# Patient Record
Sex: FEMALE | Race: OTHER | HISPANIC OR LATINO | Employment: UNEMPLOYED | URBAN - METROPOLITAN AREA
[De-identification: names, ages, dates, MRNs, and addresses within clinical notes are randomized per-mention and may not be internally consistent; named-entity substitution may affect disease eponyms.]

---
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
## 2023-01-05 ENCOUNTER — HOSPITAL ENCOUNTER (OUTPATIENT)
Facility: MEDICAL CENTER | Age: 18
End: 2023-01-05
Attending: NURSE PRACTITIONER
Payer: COMMERCIAL

## 2023-01-05 ENCOUNTER — ROUTINE PRENATAL (OUTPATIENT)
Dept: OBGYN | Facility: CLINIC | Age: 18
End: 2023-01-05

## 2023-01-05 VITALS — SYSTOLIC BLOOD PRESSURE: 116 MMHG | DIASTOLIC BLOOD PRESSURE: 74 MMHG | WEIGHT: 179.6 LBS

## 2023-01-05 DIAGNOSIS — Z34.03 ENCOUNTER FOR SUPERVISION OF NORMAL FIRST PREGNANCY IN THIRD TRIMESTER: ICD-10-CM

## 2023-01-05 PROCEDURE — 0502F SUBSEQUENT PRENATAL CARE: CPT | Performed by: NURSE PRACTITIONER

## 2023-01-05 NOTE — PROGRESS NOTES
SUBJECTIVE:  Pt is a 17 y.o.   at 36w2d  gestation. Presents today for follow-up prenatal care. Reports no issues at this time.  Reports good  fetal movement. Denies regular cramping/contractions, bleeding or leaking of fluid. Denies dysuria, headaches, N/V. Generally feels well today. Reports some lower abdominal pain.     OBJECTIVE:  - See prenatal vitals flow  -   Vitals:    23 1339   BP: 116/74   Weight: 179 lb 9.6 oz                 ASSESSMENT:   - IUP at 36w2d    - S=D   -   Patient Active Problem List    Diagnosis Date Noted    Encounter for supervision of normal first pregnancy in third trimester 10/14/2022         PLAN:  - S/sx pregnancy and labor warning signs vs general discomforts discussed  - Fetal movements and/or kick counts reviewed   - Adequate hydration reinforced  - Nutrition/exercise/vitamin education; continue PNV  - S/p Tdap and flu vacc  - GBS collected   - Anticipatory guidance given  - RTC in 1 weeks for follow-up prenatal care

## 2023-01-06 PROBLEM — B95.1 GROUP BETA STREP POSITIVE: Status: ACTIVE | Noted: 2023-01-06

## 2023-01-06 LAB — GP B STREP DNA SPEC QL NAA+PROBE: POSITIVE

## 2023-01-12 ENCOUNTER — ROUTINE PRENATAL (OUTPATIENT)
Dept: OBGYN | Facility: CLINIC | Age: 18
End: 2023-01-12

## 2023-01-12 VITALS — DIASTOLIC BLOOD PRESSURE: 64 MMHG | WEIGHT: 181.4 LBS | SYSTOLIC BLOOD PRESSURE: 120 MMHG

## 2023-01-12 DIAGNOSIS — Z34.03 ENCOUNTER FOR SUPERVISION OF NORMAL FIRST PREGNANCY IN THIRD TRIMESTER: ICD-10-CM

## 2023-01-12 PROCEDURE — 0502F SUBSEQUENT PRENATAL CARE: CPT | Performed by: PHYSICIAN ASSISTANT

## 2023-01-12 NOTE — PROGRESS NOTES
Pt has no complaints with cramping, UCs, VB, LOF though pt has had low back pain. +FM. GBS pos - pt notified of results. Stretching exercises d/w pt in detail, also pt to try warm packs, massage and Tylenol for back pain prn. Daily FKC and walks recommended. D/w pt IOL policy for 40-41wk, pt receptive. RTC 1 wk or sooner prn.

## 2023-01-19 ENCOUNTER — ROUTINE PRENATAL (OUTPATIENT)
Dept: OBGYN | Facility: CLINIC | Age: 18
End: 2023-01-19

## 2023-01-19 VITALS — WEIGHT: 180.2 LBS | SYSTOLIC BLOOD PRESSURE: 112 MMHG | DIASTOLIC BLOOD PRESSURE: 68 MMHG

## 2023-01-19 DIAGNOSIS — B95.1 GROUP BETA STREP POSITIVE: ICD-10-CM

## 2023-01-19 DIAGNOSIS — Z34.03 ENCOUNTER FOR SUPERVISION OF NORMAL FIRST PREGNANCY IN THIRD TRIMESTER: ICD-10-CM

## 2023-01-19 PROCEDURE — 0502F SUBSEQUENT PRENATAL CARE: CPT

## 2023-01-19 NOTE — PROGRESS NOTES
Pt here today for OB follow up  +GBS, pt informed   Reports +FM  WT: 180.2 lb  BP: 112/68  Preferred pharmacy verified with pt.  MFM Appointment: not at this time   Pt states no complaints or concerns today  Good # [phone]    Pt would like to have her cervix checked today

## 2023-01-19 NOTE — PROGRESS NOTES
S:  Anacelia here for routine OB follow up.  Reports good FM.  Denies VB, LOF, RUCs, or vaginal DC. Patient requests VE: Yes. Informed consent for vaginal exam. Patient agrees to vaginal exam: Yes    O:    Vitals:    01/19/23 1316   BP: 112/68   Weight: 180 lb 3.2 oz     See flow sheet.  VE: difficult due to patient discomfort. Closed/70%?/-2, posterior, moderate  [place]: 4    A:    IUP at 38w2d  S=D  Patient Active Problem List    Diagnosis Date Noted    Group beta Strep positive 01/06/2023    Encounter for supervision of normal first pregnancy in third trimester 10/14/2022       P:  1.  Continue FKCs.         2.  Labor precautions given.  Instructions given on where to go.  Pt receptive to education.         3.  D/w pt induction of labor indications, risks/benefits, and recommendations.  After informed consent, patient elects IOL at 41wks PRN. Request for IOL sent today       4.  Questions answered.         5.  Encouraged adequate water intake       6.  GBS positive - pt. aware       7.  F/u 1wk and PRN    Orders Placed This Encounter    INDUCTION OF LABOR       Evangelina Davis C.N.M.

## 2023-01-26 ENCOUNTER — ROUTINE PRENATAL (OUTPATIENT)
Dept: OBGYN | Facility: CLINIC | Age: 18
End: 2023-01-26

## 2023-01-26 ENCOUNTER — HOSPITAL ENCOUNTER (EMERGENCY)
Facility: MEDICAL CENTER | Age: 18
End: 2023-01-26
Attending: OBSTETRICS & GYNECOLOGY | Admitting: OBSTETRICS & GYNECOLOGY
Payer: MEDICAID

## 2023-01-26 VITALS — DIASTOLIC BLOOD PRESSURE: 70 MMHG | SYSTOLIC BLOOD PRESSURE: 126 MMHG | WEIGHT: 186.8 LBS

## 2023-01-26 VITALS
BODY MASS INDEX: 37.7 KG/M2 | HEIGHT: 59 IN | SYSTOLIC BLOOD PRESSURE: 127 MMHG | DIASTOLIC BLOOD PRESSURE: 84 MMHG | WEIGHT: 187 LBS | HEART RATE: 89 BPM | TEMPERATURE: 97 F

## 2023-01-26 DIAGNOSIS — O47.9 UTERINE CONTRACTIONS: ICD-10-CM

## 2023-01-26 DIAGNOSIS — Z34.03 ENCOUNTER FOR SUPERVISION OF NORMAL FIRST PREGNANCY IN THIRD TRIMESTER: ICD-10-CM

## 2023-01-26 PROCEDURE — 99283 EMERGENCY DEPT VISIT LOW MDM: CPT | Mod: 25

## 2023-01-26 PROCEDURE — 59025 FETAL NON-STRESS TEST: CPT | Mod: 26

## 2023-01-26 PROCEDURE — 59025 FETAL NON-STRESS TEST: CPT

## 2023-01-26 PROCEDURE — 0502F SUBSEQUENT PRENATAL CARE: CPT

## 2023-01-26 PROCEDURE — 99282 EMERGENCY DEPT VISIT SF MDM: CPT | Mod: 25

## 2023-01-26 NOTE — PROGRESS NOTES
Pt here today for OB follow up  Positive GBS, pt informed   Pt report No FM since 3 am today   WT: 186.8 lb  BP: 126/70  Preferred pharmacy verified with pt.  MFM Appointment: not at this time   Pt states has bee having contractions 5 minutes apart, states her contractions started this morning at 6:00 am. I addition pt reports had a bloody mucus. Denies LOF.   Good # [phone]

## 2023-01-26 NOTE — PROGRESS NOTES
S:  Anacelia here for routine OB follow up.  Contractions for the last 3 hours, now feeling them about 5 min apart. Reports less FM since contractions started.  Denies VB, LOF. Has some mucousy vaginal DC. Patient requests VE: Yes. Informed consent for vaginal exam. Patient agrees to vaginal exam: Yes    O:    Vitals:    01/26/23 0918   BP: 126/70   Weight: 186 lb 12.8 oz   See flow sheet.  VE: 1/80/-2, posterior, soft, vertex    A:    IUP at 39w2d  S=D  Patient Active Problem List    Diagnosis Date Noted    Group beta Strep positive 01/06/2023    Encounter for supervision of normal first pregnancy in third trimester 10/14/2022     P:  1.  Continue FKCs.         2.  Labor precautions given.  Instructions given on where to go.  Pt receptive to education.         3.   Reviewed early labor comfort measures, when to RTC       4.  Questions answered.         5.  Encouraged adequate water intake       6.  GBS + - reviewed w pt. And reviewed need for abx in labor       7.  F/u 1wk and PRN    MARYBETH RomanNMARK.

## 2023-01-27 ENCOUNTER — APPOINTMENT (OUTPATIENT)
Dept: RADIOLOGY | Facility: MEDICAL CENTER | Age: 18
End: 2023-01-27
Attending: INTERNAL MEDICINE
Payer: MEDICAID

## 2023-01-27 ENCOUNTER — ANESTHESIA (OUTPATIENT)
Dept: ANESTHESIOLOGY | Facility: MEDICAL CENTER | Age: 18
End: 2023-01-27
Payer: MEDICAID

## 2023-01-27 ENCOUNTER — APPOINTMENT (OUTPATIENT)
Dept: RADIOLOGY | Facility: MEDICAL CENTER | Age: 18
End: 2023-01-27
Attending: OBSTETRICS & GYNECOLOGY
Payer: MEDICAID

## 2023-01-27 ENCOUNTER — HOSPITAL ENCOUNTER (INPATIENT)
Facility: MEDICAL CENTER | Age: 18
LOS: 3 days | End: 2023-01-30
Attending: OBSTETRICS & GYNECOLOGY | Admitting: OBSTETRICS & GYNECOLOGY
Payer: MEDICAID

## 2023-01-27 ENCOUNTER — ANESTHESIA EVENT (OUTPATIENT)
Dept: ANESTHESIOLOGY | Facility: MEDICAL CENTER | Age: 18
End: 2023-01-27
Payer: MEDICAID

## 2023-01-27 DIAGNOSIS — R52 POSTPARTUM PAIN: ICD-10-CM

## 2023-01-27 DIAGNOSIS — O15.9 ECLAMPSIA: ICD-10-CM

## 2023-01-27 DIAGNOSIS — R56.9 SEIZURE (HCC): ICD-10-CM

## 2023-01-27 PROBLEM — I10 HYPERTENSION: Status: ACTIVE | Noted: 2023-01-27

## 2023-01-27 PROBLEM — G93.6 CEREBRAL EDEMA (HCC): Status: ACTIVE | Noted: 2023-01-27

## 2023-01-27 LAB
ALBUMIN SERPL BCP-MCNC: 2.9 G/DL (ref 3.2–4.9)
ALBUMIN/GLOB SERPL: 0.9 G/DL
ALP SERPL-CCNC: 235 U/L (ref 30–99)
ALT SERPL-CCNC: 11 U/L (ref 2–50)
AMPHET UR QL SCN: NEGATIVE
ANION GAP SERPL CALC-SCNC: 19 MMOL/L (ref 7–16)
APPEARANCE UR: CLEAR
AST SERPL-CCNC: 21 U/L (ref 12–45)
BACTERIA #/AREA URNS HPF: NEGATIVE /HPF
BARBITURATES UR QL SCN: NEGATIVE
BASE EXCESS BLDA CALC-SCNC: -11 MMOL/L (ref -4–3)
BASE EXCESS BLDA CALC-SCNC: -7 MMOL/L (ref -4–3)
BASOPHILS # BLD AUTO: 0.2 % (ref 0–1.8)
BASOPHILS # BLD AUTO: 0.3 % (ref 0–1.8)
BASOPHILS # BLD: 0.04 K/UL (ref 0–0.12)
BASOPHILS # BLD: 0.05 K/UL (ref 0–0.12)
BENZODIAZ UR QL SCN: NEGATIVE
BILIRUB SERPL-MCNC: 0.2 MG/DL (ref 0.1–1.5)
BILIRUB UR QL STRIP.AUTO: NEGATIVE
BODY TEMPERATURE: 39.7 CENTIGRADE
BODY TEMPERATURE: ABNORMAL DEGREES
BUN SERPL-MCNC: 13 MG/DL (ref 8–22)
BZE UR QL SCN: NEGATIVE
CALCIUM ALBUM COR SERPL-MCNC: 9.8 MG/DL (ref 8.5–10.5)
CALCIUM SERPL-MCNC: 8.9 MG/DL (ref 8.5–10.5)
CANNABINOIDS UR QL SCN: NEGATIVE
CHLORIDE SERPL-SCNC: 106 MMOL/L (ref 96–112)
CO2 BLDA-SCNC: 18 MMOL/L (ref 20–33)
CO2 SERPL-SCNC: 12 MMOL/L (ref 20–33)
COLOR UR: YELLOW
CREAT SERPL-MCNC: 1 MG/DL (ref 0.5–1.4)
CREAT UR-MCNC: 78.23 MG/DL
EOSINOPHIL # BLD AUTO: 0.01 K/UL (ref 0–0.51)
EOSINOPHIL # BLD AUTO: 0.31 K/UL (ref 0–0.51)
EOSINOPHIL NFR BLD: 0 % (ref 0–6.9)
EOSINOPHIL NFR BLD: 2.4 % (ref 0–6.9)
EPI CELLS #/AREA URNS HPF: ABNORMAL /HPF
ERYTHROCYTE [DISTWIDTH] IN BLOOD BY AUTOMATED COUNT: 45 FL (ref 35.9–50)
ERYTHROCYTE [DISTWIDTH] IN BLOOD BY AUTOMATED COUNT: 46 FL (ref 35.9–50)
GFR SERPLBLD CREATININE-BSD FMLA CKD-EPI: 83 ML/MIN/1.73 M 2
GLOBULIN SER CALC-MCNC: 3.2 G/DL (ref 1.9–3.5)
GLUCOSE SERPL-MCNC: 123 MG/DL (ref 65–99)
GLUCOSE UR STRIP.AUTO-MCNC: NEGATIVE MG/DL
HCO3 BLDA-SCNC: 12 MMOL/L (ref 17–25)
HCO3 BLDA-SCNC: 17.2 MMOL/L (ref 17–25)
HCT VFR BLD AUTO: 38.2 % (ref 37–47)
HCT VFR BLD AUTO: 41.6 % (ref 37–47)
HGB BLD-MCNC: 12.2 G/DL (ref 12–16)
HGB BLD-MCNC: 13.3 G/DL (ref 12–16)
HOLDING TUBE BB 8507: NORMAL
HOROWITZ INDEX BLDA+IHG-RTO: 358 MM[HG]
HYALINE CASTS #/AREA URNS LPF: ABNORMAL /LPF
IMM GRANULOCYTES # BLD AUTO: 0.08 K/UL (ref 0–0.11)
IMM GRANULOCYTES # BLD AUTO: 0.45 K/UL (ref 0–0.11)
IMM GRANULOCYTES NFR BLD AUTO: 0.6 % (ref 0–0.9)
IMM GRANULOCYTES NFR BLD AUTO: 1.6 % (ref 0–0.9)
INR PPP: 1.05 (ref 0.87–1.13)
KETONES UR STRIP.AUTO-MCNC: 15 MG/DL
LACTATE BLD-SCNC: 1.7 MMOL/L (ref 0.5–2)
LACTATE SERPL-SCNC: 8.7 MMOL/L (ref 0.5–2)
LEUKOCYTE ESTERASE UR QL STRIP.AUTO: ABNORMAL
LYMPHOCYTES # BLD AUTO: 3.81 K/UL (ref 1–4.8)
LYMPHOCYTES # BLD AUTO: 4.48 K/UL (ref 1–4.8)
LYMPHOCYTES NFR BLD: 15.8 % (ref 22–41)
LYMPHOCYTES NFR BLD: 29.9 % (ref 22–41)
MCH RBC QN AUTO: 28.4 PG (ref 27–33)
MCH RBC QN AUTO: 28.5 PG (ref 27–33)
MCHC RBC AUTO-ENTMCNC: 31.9 G/DL (ref 33.6–35)
MCHC RBC AUTO-ENTMCNC: 32 G/DL (ref 33.6–35)
MCV RBC AUTO: 88.7 FL (ref 81.4–97.8)
MCV RBC AUTO: 89.3 FL (ref 81.4–97.8)
METHADONE UR QL SCN: NEGATIVE
MICRO URNS: ABNORMAL
MONOCYTES # BLD AUTO: 0.95 K/UL (ref 0–0.85)
MONOCYTES # BLD AUTO: 1.46 K/UL (ref 0–0.85)
MONOCYTES NFR BLD AUTO: 5.2 % (ref 0–13.4)
MONOCYTES NFR BLD AUTO: 7.5 % (ref 0–13.4)
NEUTROPHILS # BLD AUTO: 21.84 K/UL (ref 2–7.15)
NEUTROPHILS # BLD AUTO: 7.56 K/UL (ref 2–7.15)
NEUTROPHILS NFR BLD: 59.3 % (ref 44–72)
NEUTROPHILS NFR BLD: 77.2 % (ref 44–72)
NITRITE UR QL STRIP.AUTO: NEGATIVE
NRBC # BLD AUTO: 0 K/UL
NRBC # BLD AUTO: 0 K/UL
NRBC BLD-RTO: 0 /100 WBC
NRBC BLD-RTO: 0 /100 WBC
NT-PROBNP SERPL IA-MCNC: 271 PG/ML (ref 0–125)
O2/TOTAL GAS SETTING VFR VENT: 50 %
OPIATES UR QL SCN: NEGATIVE
OXYCODONE UR QL SCN: NEGATIVE
PCO2 BLDA: 22.5 MMHG (ref 26–37)
PCO2 BLDA: 31.5 MMHG (ref 26–37)
PCO2 TEMP ADJ BLDA: 25.3 MMHG (ref 26–37)
PCO2 TEMP ADJ BLDA: 33.5 MMHG (ref 26–37)
PCP UR QL SCN: NEGATIVE
PH BLDA: 7.34 [PH] (ref 7.4–7.5)
PH BLDA: 7.36 [PH] (ref 7.4–7.5)
PH TEMP ADJ BLDA: 7.32 [PH] (ref 7.4–7.5)
PH TEMP ADJ BLDA: 7.32 [PH] (ref 7.4–7.5)
PH UR STRIP.AUTO: 7.5 [PH] (ref 5–8)
PLATELET # BLD AUTO: 281 K/UL (ref 164–446)
PLATELET # BLD AUTO: 303 K/UL (ref 164–446)
PMV BLD AUTO: 11.9 FL (ref 9–12.9)
PMV BLD AUTO: 11.9 FL (ref 9–12.9)
PO2 BLDA: 155.7 MMHG (ref 64–87)
PO2 BLDA: 179 MMHG (ref 64–87)
PO2 TEMP ADJ BLDA: 172.4 MMHG (ref 64–87)
PO2 TEMP ADJ BLDA: 187 MMHG (ref 64–87)
POTASSIUM SERPL-SCNC: 4.1 MMOL/L (ref 3.6–5.5)
PROPOXYPH UR QL SCN: NEGATIVE
PROT SERPL-MCNC: 6.1 G/DL (ref 6–8.2)
PROT UR QL STRIP: 30 MG/DL
PROT UR-MCNC: 55 MG/DL (ref 0–15)
PROT/CREAT UR: 703 MG/G (ref 10–107)
PROTHROMBIN TIME: 13.6 SEC (ref 12–14.6)
RBC # BLD AUTO: 4.28 M/UL (ref 4.2–5.4)
RBC # BLD AUTO: 4.69 M/UL (ref 4.2–5.4)
RBC # URNS HPF: >150 /HPF
RBC UR QL AUTO: ABNORMAL
SAO2 % BLDA: 100 % (ref 93–99)
SAO2 % BLDA: 97.9 % (ref 93–99)
SODIUM SERPL-SCNC: 137 MMOL/L (ref 135–145)
SP GR UR STRIP.AUTO: 1.01
SPECIMEN DRAWN FROM PATIENT: ABNORMAL
T PALLIDUM AB SER QL IA: NORMAL
TRIGL SERPL-MCNC: 294 MG/DL (ref 0–149)
TROPONIN T SERPL-MCNC: 118 NG/L (ref 6–19)
TSH SERPL DL<=0.005 MIU/L-ACNC: 1.75 UIU/ML (ref 0.38–5.33)
UROBILINOGEN UR STRIP.AUTO-MCNC: 0.2 MG/DL
WBC # BLD AUTO: 12.8 K/UL (ref 4.8–10.8)
WBC # BLD AUTO: 28.3 K/UL (ref 4.8–10.8)
WBC #/AREA URNS HPF: ABNORMAL /HPF

## 2023-01-27 PROCEDURE — 700111 HCHG RX REV CODE 636 W/ 250 OVERRIDE (IP): Performed by: ANESTHESIOLOGY

## 2023-01-27 PROCEDURE — 83880 ASSAY OF NATRIURETIC PEPTIDE: CPT

## 2023-01-27 PROCEDURE — 36556 INSERT NON-TUNNEL CV CATH: CPT | Mod: RT | Performed by: INTERNAL MEDICINE

## 2023-01-27 PROCEDURE — 86780 TREPONEMA PALLIDUM: CPT

## 2023-01-27 PROCEDURE — 36620 INSERTION CATHETER ARTERY: CPT

## 2023-01-27 PROCEDURE — 700105 HCHG RX REV CODE 258

## 2023-01-27 PROCEDURE — A9270 NON-COVERED ITEM OR SERVICE: HCPCS | Performed by: OBSTETRICS & GYNECOLOGY

## 2023-01-27 PROCEDURE — 84478 ASSAY OF TRIGLYCERIDES: CPT

## 2023-01-27 PROCEDURE — 84156 ASSAY OF PROTEIN URINE: CPT

## 2023-01-27 PROCEDURE — 59025 FETAL NON-STRESS TEST: CPT

## 2023-01-27 PROCEDURE — 700111 HCHG RX REV CODE 636 W/ 250 OVERRIDE (IP): Performed by: INTERNAL MEDICINE

## 2023-01-27 PROCEDURE — 70450 CT HEAD/BRAIN W/O DYE: CPT

## 2023-01-27 PROCEDURE — 0HQ9XZZ REPAIR PERINEUM SKIN, EXTERNAL APPROACH: ICD-10-PCS | Performed by: OBSTETRICS & GYNECOLOGY

## 2023-01-27 PROCEDURE — 92950 HEART/LUNG RESUSCITATION CPR: CPT

## 2023-01-27 PROCEDURE — 80307 DRUG TEST PRSMV CHEM ANLYZR: CPT

## 2023-01-27 PROCEDURE — 700105 HCHG RX REV CODE 258: Performed by: OBSTETRICS & GYNECOLOGY

## 2023-01-27 PROCEDURE — 36556 INSERT NON-TUNNEL CV CATH: CPT

## 2023-01-27 PROCEDURE — 83605 ASSAY OF LACTIC ACID: CPT

## 2023-01-27 PROCEDURE — 700101 HCHG RX REV CODE 250

## 2023-01-27 PROCEDURE — 770022 HCHG ROOM/CARE - ICU (200)

## 2023-01-27 PROCEDURE — 82570 ASSAY OF URINE CREATININE: CPT

## 2023-01-27 PROCEDURE — 81001 URINALYSIS AUTO W/SCOPE: CPT

## 2023-01-27 PROCEDURE — 01967 NEURAXL LBR ANES VAG DLVR: CPT | Performed by: ANESTHESIOLOGY

## 2023-01-27 PROCEDURE — 94002 VENT MGMT INPAT INIT DAY: CPT

## 2023-01-27 PROCEDURE — 84484 ASSAY OF TROPONIN QUANT: CPT

## 2023-01-27 PROCEDURE — 37799 UNLISTED PX VASCULAR SURGERY: CPT

## 2023-01-27 PROCEDURE — 71045 X-RAY EXAM CHEST 1 VIEW: CPT

## 2023-01-27 PROCEDURE — 71275 CT ANGIOGRAPHY CHEST: CPT

## 2023-01-27 PROCEDURE — 85025 COMPLETE CBC W/AUTO DIFF WBC: CPT

## 2023-01-27 PROCEDURE — 87086 URINE CULTURE/COLONY COUNT: CPT

## 2023-01-27 PROCEDURE — 700117 HCHG RX CONTRAST REV CODE 255: Performed by: INTERNAL MEDICINE

## 2023-01-27 PROCEDURE — 59409 OBSTETRICAL CARE: CPT | Performed by: OBSTETRICS & GYNECOLOGY

## 2023-01-27 PROCEDURE — 700111 HCHG RX REV CODE 636 W/ 250 OVERRIDE (IP): Performed by: OBSTETRICS & GYNECOLOGY

## 2023-01-27 PROCEDURE — 94799 UNLISTED PULMONARY SVC/PX: CPT

## 2023-01-27 PROCEDURE — 0BH17EZ INSERTION OF ENDOTRACHEAL AIRWAY INTO TRACHEA, VIA NATURAL OR ARTIFICIAL OPENING: ICD-10-PCS | Performed by: ANESTHESIOLOGY

## 2023-01-27 PROCEDURE — 700111 HCHG RX REV CODE 636 W/ 250 OVERRIDE (IP)

## 2023-01-27 PROCEDURE — A9270 NON-COVERED ITEM OR SERVICE: HCPCS

## 2023-01-27 PROCEDURE — 302132 K THERMIA MOTOR: Performed by: OBSTETRICS & GYNECOLOGY

## 2023-01-27 PROCEDURE — 99283 EMERGENCY DEPT VISIT LOW MDM: CPT

## 2023-01-27 PROCEDURE — 80053 COMPREHEN METABOLIC PANEL: CPT

## 2023-01-27 PROCEDURE — 303615 HCHG EPIDURAL/SPINAL ANESTHESIA FOR LABOR

## 2023-01-27 PROCEDURE — 700102 HCHG RX REV CODE 250 W/ 637 OVERRIDE(OP): Performed by: OBSTETRICS & GYNECOLOGY

## 2023-01-27 PROCEDURE — C1751 CATH, INF, PER/CENT/MIDLINE: HCPCS

## 2023-01-27 PROCEDURE — 99292 CRITICAL CARE ADDL 30 MIN: CPT | Mod: 25 | Performed by: INTERNAL MEDICINE

## 2023-01-27 PROCEDURE — 99999 PR NO CHARGE: CPT | Performed by: OBSTETRICS & GYNECOLOGY

## 2023-01-27 PROCEDURE — 700105 HCHG RX REV CODE 258: Performed by: ANESTHESIOLOGY

## 2023-01-27 PROCEDURE — 82803 BLOOD GASES ANY COMBINATION: CPT

## 2023-01-27 PROCEDURE — 70496 CT ANGIOGRAPHY HEAD: CPT

## 2023-01-27 PROCEDURE — 99291 CRITICAL CARE FIRST HOUR: CPT | Mod: 25 | Performed by: INTERNAL MEDICINE

## 2023-01-27 PROCEDURE — 302151 K-PAD 14X20: Performed by: OBSTETRICS & GYNECOLOGY

## 2023-01-27 PROCEDURE — 84443 ASSAY THYROID STIM HORMONE: CPT

## 2023-01-27 PROCEDURE — 03HY32Z INSERTION OF MONITORING DEVICE INTO UPPER ARTERY, PERCUTANEOUS APPROACH: ICD-10-PCS | Performed by: INTERNAL MEDICINE

## 2023-01-27 PROCEDURE — 02HV33Z INSERTION OF INFUSION DEVICE INTO SUPERIOR VENA CAVA, PERCUTANEOUS APPROACH: ICD-10-PCS | Performed by: INTERNAL MEDICINE

## 2023-01-27 PROCEDURE — 5A1935Z RESPIRATORY VENTILATION, LESS THAN 24 CONSECUTIVE HOURS: ICD-10-PCS | Performed by: INTERNAL MEDICINE

## 2023-01-27 PROCEDURE — 85610 PROTHROMBIN TIME: CPT

## 2023-01-27 PROCEDURE — 36620 INSERTION CATHETER ARTERY: CPT | Performed by: INTERNAL MEDICINE

## 2023-01-27 PROCEDURE — 700102 HCHG RX REV CODE 250 W/ 637 OVERRIDE(OP)

## 2023-01-27 RX ORDER — DEXTROSE, SODIUM CHLORIDE, SODIUM LACTATE, POTASSIUM CHLORIDE, AND CALCIUM CHLORIDE 5; .6; .31; .03; .02 G/100ML; G/100ML; G/100ML; G/100ML; G/100ML
INJECTION, SOLUTION INTRAVENOUS CONTINUOUS
Status: DISCONTINUED | OUTPATIENT
Start: 2023-01-27 | End: 2023-01-27

## 2023-01-27 RX ORDER — AMOXICILLIN 250 MG
2 CAPSULE ORAL 2 TIMES DAILY
Status: DISCONTINUED | OUTPATIENT
Start: 2023-01-27 | End: 2023-01-29

## 2023-01-27 RX ORDER — SODIUM CHLORIDE 9 MG/ML
1000 INJECTION, SOLUTION INTRAVENOUS ONCE
Status: COMPLETED | OUTPATIENT
Start: 2023-01-27 | End: 2023-01-27

## 2023-01-27 RX ORDER — MAGNESIUM SULFATE HEPTAHYDRATE 40 MG/ML
4 INJECTION, SOLUTION INTRAVENOUS ONCE
Status: COMPLETED | OUTPATIENT
Start: 2023-01-27 | End: 2023-01-27

## 2023-01-27 RX ORDER — LIDOCAINE HYDROCHLORIDE 10 MG/ML
20 INJECTION, SOLUTION INFILTRATION; PERINEURAL
Status: COMPLETED | OUTPATIENT
Start: 2023-01-27 | End: 2023-01-27

## 2023-01-27 RX ORDER — LORAZEPAM 2 MG/ML
2 INJECTION INTRAMUSCULAR ONCE
Status: DISCONTINUED | OUTPATIENT
Start: 2023-01-27 | End: 2023-01-27 | Stop reason: HOSPADM

## 2023-01-27 RX ORDER — SODIUM CHLORIDE, SODIUM LACTATE, POTASSIUM CHLORIDE, AND CALCIUM CHLORIDE .6; .31; .03; .02 G/100ML; G/100ML; G/100ML; G/100ML
250 INJECTION, SOLUTION INTRAVENOUS PRN
Status: DISCONTINUED | OUTPATIENT
Start: 2023-01-27 | End: 2023-01-27

## 2023-01-27 RX ORDER — SODIUM CHLORIDE, SODIUM LACTATE, POTASSIUM CHLORIDE, CALCIUM CHLORIDE 600; 310; 30; 20 MG/100ML; MG/100ML; MG/100ML; MG/100ML
INJECTION, SOLUTION INTRAVENOUS PRN
Status: CANCELLED | OUTPATIENT
Start: 2023-01-27

## 2023-01-27 RX ORDER — MISOPROSTOL 200 UG/1
600 TABLET ORAL
Status: CANCELLED | OUTPATIENT
Start: 2023-01-27

## 2023-01-27 RX ORDER — MAGNESIUM SULFATE HEPTAHYDRATE 40 MG/ML
2 INJECTION, SOLUTION INTRAVENOUS CONTINUOUS
Status: DISCONTINUED | OUTPATIENT
Start: 2023-01-27 | End: 2023-01-28

## 2023-01-27 RX ORDER — OXYTOCIN 10 [USP'U]/ML
10 INJECTION, SOLUTION INTRAMUSCULAR; INTRAVENOUS
Status: COMPLETED | OUTPATIENT
Start: 2023-01-27 | End: 2023-01-27

## 2023-01-27 RX ORDER — HYDRALAZINE HYDROCHLORIDE 20 MG/ML
5-10 INJECTION INTRAMUSCULAR; INTRAVENOUS
Status: DISCONTINUED | OUTPATIENT
Start: 2023-01-27 | End: 2023-01-27 | Stop reason: HOSPADM

## 2023-01-27 RX ORDER — LORAZEPAM 2 MG/ML
2 INJECTION INTRAMUSCULAR EVERY 4 HOURS PRN
Status: DISCONTINUED | OUTPATIENT
Start: 2023-01-27 | End: 2023-01-27

## 2023-01-27 RX ORDER — LORAZEPAM 2 MG/ML
INJECTION INTRAMUSCULAR
Status: COMPLETED
Start: 2023-01-27 | End: 2023-01-27

## 2023-01-27 RX ORDER — POLYETHYLENE GLYCOL 3350 17 G/17G
1 POWDER, FOR SOLUTION ORAL
Status: DISCONTINUED | OUTPATIENT
Start: 2023-01-27 | End: 2023-01-29

## 2023-01-27 RX ORDER — BUPIVACAINE HYDROCHLORIDE 2.5 MG/ML
INJECTION, SOLUTION EPIDURAL; INFILTRATION; INTRACAUDAL
Status: COMPLETED
Start: 2023-01-27 | End: 2023-01-27

## 2023-01-27 RX ORDER — IBUPROFEN 800 MG/1
800 TABLET ORAL EVERY 8 HOURS PRN
Status: CANCELLED | OUTPATIENT
Start: 2023-01-27

## 2023-01-27 RX ORDER — ROPIVACAINE HYDROCHLORIDE 2 MG/ML
INJECTION, SOLUTION EPIDURAL; INFILTRATION; PERINEURAL
Status: COMPLETED
Start: 2023-01-27 | End: 2023-01-27

## 2023-01-27 RX ORDER — CALCIUM GLUCONATE 94 MG/ML
1 INJECTION, SOLUTION INTRAVENOUS
Status: DISCONTINUED | OUTPATIENT
Start: 2023-01-27 | End: 2023-01-28

## 2023-01-27 RX ORDER — MAGNESIUM SULFATE HEPTAHYDRATE 40 MG/ML
INJECTION, SOLUTION INTRAVENOUS
Status: COMPLETED
Start: 2023-01-27 | End: 2023-01-27

## 2023-01-27 RX ORDER — IPRATROPIUM BROMIDE AND ALBUTEROL SULFATE 2.5; .5 MG/3ML; MG/3ML
3 SOLUTION RESPIRATORY (INHALATION)
Status: DISCONTINUED | OUTPATIENT
Start: 2023-01-27 | End: 2023-01-29

## 2023-01-27 RX ORDER — MAGNESIUM SULFATE HEPTAHYDRATE 40 MG/ML
2 INJECTION, SOLUTION INTRAVENOUS ONCE
Status: COMPLETED | OUTPATIENT
Start: 2023-01-27 | End: 2023-01-27

## 2023-01-27 RX ORDER — MISOPROSTOL 200 UG/1
800 TABLET ORAL
Status: COMPLETED | OUTPATIENT
Start: 2023-01-27 | End: 2023-01-27

## 2023-01-27 RX ORDER — BUPIVACAINE HYDROCHLORIDE 2.5 MG/ML
INJECTION, SOLUTION EPIDURAL; INFILTRATION; INTRACAUDAL PRN
Status: DISCONTINUED | OUTPATIENT
Start: 2023-01-27 | End: 2023-01-27 | Stop reason: SURG

## 2023-01-27 RX ORDER — TERBUTALINE SULFATE 1 MG/ML
0.25 INJECTION, SOLUTION SUBCUTANEOUS
Status: DISCONTINUED | OUTPATIENT
Start: 2023-01-27 | End: 2023-01-27 | Stop reason: HOSPADM

## 2023-01-27 RX ORDER — SODIUM CHLORIDE 9 MG/ML
2000 INJECTION, SOLUTION INTRAVENOUS ONCE
Status: COMPLETED | OUTPATIENT
Start: 2023-01-27 | End: 2023-01-28

## 2023-01-27 RX ORDER — ACETAMINOPHEN 650 MG/1
975 SUPPOSITORY RECTAL EVERY 4 HOURS PRN
Status: DISCONTINUED | OUTPATIENT
Start: 2023-01-27 | End: 2023-01-28

## 2023-01-27 RX ORDER — BISACODYL 10 MG
10 SUPPOSITORY, RECTAL RECTAL
Status: DISCONTINUED | OUTPATIENT
Start: 2023-01-27 | End: 2023-01-29

## 2023-01-27 RX ORDER — METHYLERGONOVINE MALEATE 0.2 MG/ML
0.2 INJECTION INTRAVENOUS
Status: DISCONTINUED | OUTPATIENT
Start: 2023-01-27 | End: 2023-01-27 | Stop reason: HOSPADM

## 2023-01-27 RX ORDER — DOCUSATE SODIUM 100 MG/1
100 CAPSULE, LIQUID FILLED ORAL 2 TIMES DAILY PRN
Status: CANCELLED | OUTPATIENT
Start: 2023-01-27

## 2023-01-27 RX ORDER — HYDROMORPHONE HYDROCHLORIDE 1 MG/ML
.5-2 INJECTION, SOLUTION INTRAMUSCULAR; INTRAVENOUS; SUBCUTANEOUS
Status: DISCONTINUED | OUTPATIENT
Start: 2023-01-27 | End: 2023-01-28

## 2023-01-27 RX ORDER — ALUMINA, MAGNESIA, AND SIMETHICONE 2400; 2400; 240 MG/30ML; MG/30ML; MG/30ML
30 SUSPENSION ORAL EVERY 6 HOURS PRN
Status: DISCONTINUED | OUTPATIENT
Start: 2023-01-27 | End: 2023-01-27 | Stop reason: HOSPADM

## 2023-01-27 RX ORDER — FAMOTIDINE 20 MG/1
20 TABLET, FILM COATED ORAL EVERY 12 HOURS
Status: DISCONTINUED | OUTPATIENT
Start: 2023-01-27 | End: 2023-01-28

## 2023-01-27 RX ORDER — SODIUM CHLORIDE 9 MG/ML
INJECTION, SOLUTION INTRAVENOUS CONTINUOUS
Status: DISCONTINUED | OUTPATIENT
Start: 2023-01-27 | End: 2023-01-28

## 2023-01-27 RX ORDER — ROPIVACAINE HYDROCHLORIDE 2 MG/ML
INJECTION, SOLUTION EPIDURAL; INFILTRATION; PERINEURAL CONTINUOUS
Status: DISCONTINUED | OUTPATIENT
Start: 2023-01-27 | End: 2023-01-27

## 2023-01-27 RX ORDER — SODIUM CHLORIDE, SODIUM LACTATE, POTASSIUM CHLORIDE, AND CALCIUM CHLORIDE .6; .31; .03; .02 G/100ML; G/100ML; G/100ML; G/100ML
1000 INJECTION, SOLUTION INTRAVENOUS
Status: COMPLETED | OUTPATIENT
Start: 2023-01-27 | End: 2023-01-27

## 2023-01-27 RX ORDER — BUPIVACAINE HYDROCHLORIDE 2.5 MG/ML
INJECTION, SOLUTION EPIDURAL; INFILTRATION; INTRACAUDAL
Status: DISCONTINUED
Start: 2023-01-27 | End: 2023-01-28

## 2023-01-27 RX ORDER — CITRIC ACID/SODIUM CITRATE 334-500MG
30 SOLUTION, ORAL ORAL EVERY 6 HOURS PRN
Status: DISCONTINUED | OUTPATIENT
Start: 2023-01-27 | End: 2023-01-27 | Stop reason: HOSPADM

## 2023-01-27 RX ORDER — ACETAMINOPHEN 500 MG
1000 TABLET ORAL
Status: DISCONTINUED | OUTPATIENT
Start: 2023-01-27 | End: 2023-01-27 | Stop reason: HOSPADM

## 2023-01-27 RX ORDER — HYDROXYZINE 50 MG/1
50 TABLET, FILM COATED ORAL EVERY 6 HOURS PRN
Status: DISCONTINUED | OUTPATIENT
Start: 2023-01-27 | End: 2023-01-27

## 2023-01-27 RX ORDER — LORAZEPAM 2 MG/ML
2 INJECTION INTRAMUSCULAR
Status: DISCONTINUED | OUTPATIENT
Start: 2023-01-27 | End: 2023-01-30 | Stop reason: HOSPADM

## 2023-01-27 RX ORDER — ACETAMINOPHEN 500 MG
1000 TABLET ORAL EVERY 6 HOURS PRN
Status: CANCELLED | OUTPATIENT
Start: 2023-01-27

## 2023-01-27 RX ORDER — IBUPROFEN 800 MG/1
800 TABLET ORAL
Status: DISCONTINUED | OUTPATIENT
Start: 2023-01-27 | End: 2023-01-27

## 2023-01-27 RX ORDER — NIFEDIPINE 10 MG/1
10 CAPSULE ORAL
Status: DISCONTINUED | OUTPATIENT
Start: 2023-01-27 | End: 2023-01-27 | Stop reason: HOSPADM

## 2023-01-27 RX ORDER — LABETALOL HYDROCHLORIDE 5 MG/ML
20-80 INJECTION, SOLUTION INTRAVENOUS
Status: DISCONTINUED | OUTPATIENT
Start: 2023-01-27 | End: 2023-01-27 | Stop reason: HOSPADM

## 2023-01-27 RX ORDER — SODIUM CHLORIDE, SODIUM LACTATE, POTASSIUM CHLORIDE, CALCIUM CHLORIDE 600; 310; 30; 20 MG/100ML; MG/100ML; MG/100ML; MG/100ML
INJECTION, SOLUTION INTRAVENOUS CONTINUOUS
Status: DISCONTINUED | OUTPATIENT
Start: 2023-01-27 | End: 2023-01-27

## 2023-01-27 RX ADMIN — SODIUM CHLORIDE, POTASSIUM CHLORIDE, SODIUM LACTATE AND CALCIUM CHLORIDE: 600; 310; 30; 20 INJECTION, SOLUTION INTRAVENOUS at 06:20

## 2023-01-27 RX ADMIN — LABETALOL HYDROCHLORIDE 10 MG: 5 INJECTION, SOLUTION INTRAVENOUS at 19:42

## 2023-01-27 RX ADMIN — ROPIVACAINE HYDROCHLORIDE: 2 INJECTION, SOLUTION EPIDURAL; INFILTRATION at 13:36

## 2023-01-27 RX ADMIN — BUPIVACAINE HYDROCHLORIDE 3 ML: 2.5 INJECTION, SOLUTION EPIDURAL; INFILTRATION; INTRACAUDAL; PERINEURAL at 11:34

## 2023-01-27 RX ADMIN — BUPIVACAINE HYDROCHLORIDE 2 ML: 2.5 INJECTION, SOLUTION EPIDURAL; INFILTRATION; INTRACAUDAL; PERINEURAL at 11:36

## 2023-01-27 RX ADMIN — BUPIVACAINE HYDROCHLORIDE 2 ML: 2.5 INJECTION, SOLUTION EPIDURAL; INFILTRATION; INTRACAUDAL; PERINEURAL at 05:36

## 2023-01-27 RX ADMIN — SODIUM CHLORIDE 1000 ML: 9 INJECTION, SOLUTION INTRAVENOUS at 19:19

## 2023-01-27 RX ADMIN — MAGNESIUM SULFATE HEPTAHYDRATE: 40 INJECTION, SOLUTION INTRAVENOUS at 19:20

## 2023-01-27 RX ADMIN — SODIUM CHLORIDE 5 MILLION UNITS: 900 INJECTION INTRAVENOUS at 06:50

## 2023-01-27 RX ADMIN — MISOPROSTOL 800 MCG: 200 TABLET ORAL at 18:10

## 2023-01-27 RX ADMIN — IOHEXOL 95 ML: 350 INJECTION, SOLUTION INTRAVENOUS at 23:15

## 2023-01-27 RX ADMIN — ROPIVACAINE HYDROCHLORIDE: 2 INJECTION, SOLUTION EPIDURAL; INFILTRATION at 05:40

## 2023-01-27 RX ADMIN — SODIUM CHLORIDE, SODIUM LACTATE, POTASSIUM CHLORIDE, CALCIUM CHLORIDE AND DEXTROSE MONOHYDRATE: 5; 600; 310; 30; 20 INJECTION, SOLUTION INTRAVENOUS at 12:08

## 2023-01-27 RX ADMIN — LORAZEPAM 2 MG: 2 INJECTION INTRAMUSCULAR; INTRAVENOUS at 19:48

## 2023-01-27 RX ADMIN — FENTANYL CITRATE 50 MCG: 50 INJECTION, SOLUTION INTRAMUSCULAR; INTRAVENOUS at 05:36

## 2023-01-27 RX ADMIN — MAGNESIUM SULFATE IN WATER: 40 INJECTION, SOLUTION INTRAVENOUS at 19:20

## 2023-01-27 RX ADMIN — SODIUM CHLORIDE, POTASSIUM CHLORIDE, SODIUM LACTATE AND CALCIUM CHLORIDE 1000 ML: 600; 310; 30; 20 INJECTION, SOLUTION INTRAVENOUS at 05:15

## 2023-01-27 RX ADMIN — BUPIVACAINE HYDROCHLORIDE 6 ML: 2.5 INJECTION, SOLUTION EPIDURAL; INFILTRATION; INTRACAUDAL; PERINEURAL at 13:55

## 2023-01-27 RX ADMIN — PROPOFOL 65 MCG/KG/MIN: 10 INJECTION, EMULSION INTRAVENOUS at 23:54

## 2023-01-27 RX ADMIN — WATER 2.5 MILLION UNITS: 1 INJECTION INTRAMUSCULAR; INTRAVENOUS; SUBCUTANEOUS at 15:02

## 2023-01-27 RX ADMIN — MAGNESIUM SULFATE HEPTAHYDRATE 4 G: 40 INJECTION, SOLUTION INTRAVENOUS at 19:19

## 2023-01-27 RX ADMIN — LORAZEPAM 2 MG: 2 INJECTION INTRAMUSCULAR; INTRAVENOUS at 19:33

## 2023-01-27 RX ADMIN — BUPIVACAINE HYDROCHLORIDE 5 ML: 2.5 INJECTION, SOLUTION EPIDURAL; INFILTRATION; INTRACAUDAL; PERINEURAL at 16:13

## 2023-01-27 RX ADMIN — BUPIVACAINE HYDROCHLORIDE 5 ML: 2.5 INJECTION, SOLUTION EPIDURAL; INFILTRATION; INTRACAUDAL; PERINEURAL at 16:09

## 2023-01-27 RX ADMIN — FENTANYL CITRATE 50 MCG: 50 INJECTION, SOLUTION INTRAMUSCULAR; INTRAVENOUS at 05:29

## 2023-01-27 RX ADMIN — OXYTOCIN 10 UNITS: 10 INJECTION, SOLUTION INTRAMUSCULAR; INTRAVENOUS at 18:18

## 2023-01-27 RX ADMIN — ACETAMINOPHEN 650 MG: 650 SUPPOSITORY RECTAL at 19:43

## 2023-01-27 RX ADMIN — OXYTOCIN 2 MILLI-UNITS/MIN: 10 INJECTION, SOLUTION INTRAMUSCULAR; INTRAVENOUS at 08:56

## 2023-01-27 RX ADMIN — MAGNESIUM SULFATE HEPTAHYDRATE 2 G: 40 INJECTION, SOLUTION INTRAVENOUS at 19:19

## 2023-01-27 RX ADMIN — WATER 2.5 MILLION UNITS: 1 INJECTION INTRAMUSCULAR; INTRAVENOUS; SUBCUTANEOUS at 11:29

## 2023-01-27 RX ADMIN — LORAZEPAM 2 MG: 2 INJECTION INTRAMUSCULAR; INTRAVENOUS at 19:43

## 2023-01-27 RX ADMIN — BUPIVACAINE HYDROCHLORIDE 2 ML: 2.5 INJECTION, SOLUTION EPIDURAL; INFILTRATION; INTRACAUDAL; PERINEURAL at 05:29

## 2023-01-27 RX ADMIN — FENTANYL CITRATE 100 MCG: 50 INJECTION, SOLUTION INTRAMUSCULAR; INTRAVENOUS at 13:55

## 2023-01-27 RX ADMIN — IOHEXOL 57 ML: 350 INJECTION, SOLUTION INTRAVENOUS at 20:54

## 2023-01-27 RX ADMIN — LIDOCAINE HYDROCHLORIDE 20 ML: 10 INJECTION, SOLUTION INFILTRATION; PERINEURAL at 18:40

## 2023-01-27 RX ADMIN — BUPIVACAINE HYDROCHLORIDE 3 ML: 2.5 INJECTION, SOLUTION EPIDURAL; INFILTRATION; INTRACAUDAL; PERINEURAL at 11:31

## 2023-01-27 ASSESSMENT — FIBROSIS 4 INDEX: FIB4 SCORE: 0.4

## 2023-01-27 ASSESSMENT — ENCOUNTER SYMPTOMS
CONSTITUTIONAL NEGATIVE: 1
RESPIRATORY NEGATIVE: 1
EYES NEGATIVE: 1
MUSCULOSKELETAL NEGATIVE: 1
NEUROLOGICAL NEGATIVE: 1
PSYCHIATRIC NEGATIVE: 1
ABDOMINAL PAIN: 1
CARDIOVASCULAR NEGATIVE: 1

## 2023-01-27 ASSESSMENT — PAIN DESCRIPTION - PAIN TYPE
TYPE: ACUTE PAIN

## 2023-01-27 NOTE — PROGRESS NOTES
Patient,  39.3 weeks gestation presnting in triage with complaint of ctxs every 4 minutes since last night and was instructed from her last visit in OB ED on  to return when ctxs are stronger and closer, reports good fetal movement. , Freida [id number] used for entire encounter.     0308 VE 1.5/70/-3 posterior cervix, intact membranes    0339 Call placed to MODESTO Hutchinson ID self and patient, SBAR given. FHT reviewed. Informed VE remains unchanged from previous encounter.    0344 RN at bedside, noted patient resting.     0347 MODESTO Hutchinson at bedside to discuss POC    0351 Plan for patient to be admitted to labor for augmentation. Patient requesting for epidural. KRISTINA Perrin to be notified.     3190 Report given to KRISTINA Edwards

## 2023-01-27 NOTE — ED PROVIDER NOTES
S: Pt is a 17 y.o.  at 39w2d with Estimated Date of Delivery: 23 who presents to triage c/o contractions q 5 min since this AM.  No VB, LOF.  She was seen by me in clinic today and was /-2, same exam today by RN. Reports + FM.      O: LMP 2022 (Exact Date)   127/84, HR 89       FHTs: baseline 135, + accels, no decels, mod variability       [place]: irregular UCs, mild to moderate, q 3-8 min       SVE: -/-2, posterior, soft per RN exam         A/P  Patient Active Problem List    Diagnosis Date Noted    Group beta Strep positive 2023    Encounter for supervision of normal first pregnancy in third trimester 10/14/2022       1.  IUP @ 39w2d  2.  Reactive category 1 NST, seen and read by me.  3.  Early labor: early labor comfort measures reviewed, RTC instructions provided  4.  RTC with stronger contractions that are more frequent, ROM, decreased FM, or other concerns.   5. Disposition: discharge to home under self care    Evangelina Davis C.N.M.  Attending MD: Dr. Linares/Dr. Quintero

## 2023-01-27 NOTE — PROGRESS NOTES
0445: Report received from KRISTINA Wen. Assumed care of pt.  0500: Pt brought to S215 with FOB by bedside. Pt in severe pain and desires epidural, Dr. Verma called to bedside.  0532: Epidural placed by Dr. Verma, test dose negative, pt comfortable after placement.  0645: RN at bedside for ghosh catheter placement, SROM noted with thick meconium fluid.   0700: Report given to KRISTINA Ramsey.

## 2023-01-27 NOTE — ANESTHESIA PROCEDURE NOTES
Epidural Block    Date/Time: 1/27/2023 5:23 AM  Performed by: Jc Verma M.D.  Authorized by: Jc Verma M.D.     Patient Location:  OB  Start Time:  1/27/2023 5:23 AM  End Time:  1/27/2023 5:29 AM  Reason for Block: labor analgesia    patient identified, IV checked, site marked, risks and benefits discussed, surgical consent, monitors and equipment checked, pre-op evaluation and timeout performed    Patient Position:  Sitting  Prep: ChloraPrep, patient draped and sterile technique    Monitoring:  Blood pressure, continuous pulse oximetry and heart rate  Approach:  Midline  Location:  L3-L4  Injection Technique:  ETHAN saline  Skin infiltration:  Lidocaine  Strength:  1%  Dose:  3ml  Needle Type:  Tuohy  Needle Gauge:  17 G  Needle Length:  3.5 in  Loss of resistance::  5.5  Catheter Size:  19 G  Catheter at Skin Depth:  11  Test Dose Result:  Negative   Success on 2nd pass at same level  No heme/CSF  Catheter threaded easily  Negative aspiration  No evidence of complications  Patient comfortable after loading dose

## 2023-01-27 NOTE — ED PROVIDER NOTES
"S: Pt is a 17 y.o.  at 39w3d with Estimated Date of Delivery: 23 who presents to triage c/o ongoing painful contractions that have been going on for about 36 hours. She has been unable to sleep and desires pain management.  No VB, LOF.  Reports + FM.      O: /79   Pulse 86   Temp 36.6 °C (97.8 °F) (Temporal)   Resp 16   Ht 1.5 m (4' 11.06\")   Wt 82.1 kg (181 lb)   LMP 2022 (Exact Date)   SpO2 96%   BMI 36.49 kg/m²          FHTs: baseline 125, + accels, x2 late decels, moderate variability       [place]: regular UCs and irritability but palpate very mild       SVE: 1-2/70/-3 per RN exam         A/P  Patient Active Problem List    Diagnosis Date Noted    Indication for care in labor or delivery 2023    Group beta Strep positive 2023    Encounter for supervision of normal first pregnancy in third trimester 10/14/2022       1.  IUP @ 39w3d  2.  Reactive cat 2 NST.  3.  Prodromal labor, admit for augmentation and pain management, see H&P    MARYBETH RomanNMARK.          "

## 2023-01-27 NOTE — H&P
History and Physical      Pedro Zuñiga is a 17 y.o. female  at 39w3d who presents for contractions, prodromal labor, and desire for pain management.     Subjective:       positive  For CTXS.   positive Feels pain   negative for LOF  negative for vaginal bleeding.   positive for fetal movement    ROS: Pertinent items are noted in HPI.  Review of Systems   Constitutional: Negative.    HENT: Negative.     Eyes: Negative.    Respiratory: Negative.     Cardiovascular: Negative.    Gastrointestinal:  Positive for abdominal pain (with contractions).   Genitourinary: Negative.    Musculoskeletal: Negative.    Skin: Negative.    Neurological: Negative.    Endo/Heme/Allergies: Negative.    Psychiatric/Behavioral: Negative.         No past medical history on file.  No past surgical history on file.  OB History    Para Term  AB Living   1             SAB IAB Ectopic Molar Multiple Live Births                    # Outcome Date GA Lbr Devante/2nd Weight Sex Delivery Anes PTL Lv   1 Current              Social History     Socioeconomic History    Marital status:      Spouse name: Not on file    Number of children: Not on file    Years of education: Not on file    Highest education level: Not on file   Occupational History    Not on file   Tobacco Use    Smoking status: Never    Smokeless tobacco: Never   Vaping Use    Vaping Use: Never used   Substance and Sexual Activity    Alcohol use: Not Currently    Drug use: Never    Sexual activity: Yes     Partners: Male     Comment: None   Other Topics Concern    Not on file   Social History Narrative    Not on file     Social Determinants of Health     Financial Resource Strain: Not on file   Food Insecurity: Not on file   Transportation Needs: Not on file   Physical Activity: Not on file   Stress: Not on file   Social Connections: Not on file   Intimate Partner Violence: Not on file   Housing Stability: Not on file     Allergies: Patient has no known  allergies.    Current Facility-Administered Medications:     LR infusion, , Intravenous, Continuous, MARYBETH RomanN.M.    lidocaine (XYLOCAINE) 1%  injection, 20 mL, Subcutaneous, Once PRN, MARYBETH RomanN.M.    terbutaline (BRETHINE) injection 0.25 mg, 0.25 mg, Subcutaneous, Once PRN, MARYBETH RomanN.M.    oxytocin (PITOCIN) infusion (for post delivery), 1,000 mL, Intravenous, Once **FOLLOWED BY** oxytocin (PITOCIN) infusion (for post delivery), 125 mL/hr, Intravenous, Continuous, MARYBETH RomanN.M.    oxytocin (PITOCIN) injection 10 Units, 10 Units, Intramuscular, Once PRN, MARYBETH RomanN.M.    ibuprofen (MOTRIN) tablet 800 mg, 800 mg, Oral, Once PRN, MARYBETH RomanN.M.    acetaminophen (TYLENOL) tablet 1,000 mg, 1,000 mg, Oral, Once PRN, MARYBETH RomanN.M.    fentaNYL (SUBLIMAZE) injection 50 mcg, 50 mcg, Intravenous, Q HOUR PRN **OR** fentaNYL (SUBLIMAZE) injection 100 mcg, 100 mcg, Intravenous, Q HOUR PRN, MARYBETH RomanN.M.    oxytocin (PITOCIN) infusion (for induction), 0.5-20 gisella-units/min, Intravenous, Continuous, MARYBETH RomanN.M.    penicillin G potassium 5 Million Units in  mL IVPB, 5 Million Units, Intravenous, Once **FOLLOWED BY** penicillin G potassium 2.5 million units in  mL IVPB, 2.5 Million Units, Intravenous, Q4HRS, MARYBETH RomanN.M.    labetalol (NORMODYNE/TRANDATE) injection 20-80 mg, 20-80 mg, Intravenous, Q10 MIN PRN **OR** NIFEdipine IR (PROCARDIA) capsule 10 mg, 10 mg, Oral, Q20MIN PRN **OR** hydrALAZINE (APRESOLINE) injection 5-10 mg, 5-10 mg, Intravenous, Q20MIN PRN, MARYBETH RomanN.LYNNE.    mag hydrox-al hydrox-simeth (MAALOX PLUS ES or MYLANTA DS) suspension 30 mL, 30 mL, Oral, Q6HRS PRN, MARYBETH RomanNMEREDITH    Na citrate-citric acid (BICITRA) 500-334 MG/5ML solution 30 mL, 30 mL, Oral, Q6HRS PRN, MARYBETH RomanNMEREDITH    hydrOXYzine HCl (ATARAX) tablet 50 mg, 50 mg, Oral, Q6HRS PRN,  "MARYBETH RomanNMEREDITH    miSOPROStol (CYTOTEC) tablet 800 mcg, 800 mcg, Rectal, Once PRN, Evangelina Davis C.N.M.    methylergonovine (METHERGINE) injection 0.2 mg, 0.2 mg, Intramuscular, Once PRN, Evangelina Davis C.N.M.    Prenatal care with [facility] starting at 24w3d with following problems:  Patient Active Problem List    Diagnosis Date Noted    Indication for care in labor or delivery 01/27/2023    Group beta Strep positive 01/06/2023    Encounter for supervision of normal first pregnancy in third trimester 10/14/2022           Objective:      /79   Pulse 86   Temp 36.6 °C (97.8 °F) (Temporal)   Resp 16   Ht 1.5 m (4' 11.06\")   Wt 82.1 kg (181 lb)   SpO2 96%     General:   alert, cooperative   Skin:   normal   HEENT:  Sclera clear, anicteric   Lungs:   CTA bilateral   Heart:   S1, S2 normal, no murmur, click, rub or gallop, regular rate and rhythm   Abdomen:   gravid, NT   EFW:  3500g   Pelvis:  adequate with gynecoid pelvis   FHT:  125 BPM   Uterine Size: S=D   Presentations: Cephalic   Cervix:     Dilation: 1cm    Effacement: 75%    Station:  -3    Consistency: Soft    Position: Middle     Lab Review  Lab:   Blood type: O   Rh positive  GBS positive    Recent Results (from the past 5880 hour(s))   PREG CNTR PRENATAL PN    Collection Time: 11/09/22 10:13 AM   Result Value Ref Range    Color Yellow     Character Cloudy (A)     Specific Gravity 1.027 <1.035    Ph 6.0 5.0 - 8.0    Glucose Negative Negative mg/dL    Ketones Negative Negative mg/dL    Protein Negative Negative mg/dL    Bilirubin Negative Negative    Urobilinogen, Urine 0.2 Negative    Nitrite Negative Negative    Leukocyte Esterase Small (A) Negative    Occult Blood Negative Negative    Micro Urine Req Microscopic     WBC 12.9 (H) 4.8 - 10.8 K/uL    RBC 4.21 4.20 - 5.40 M/uL    Hemoglobin 12.2 12.0 - 16.0 g/dL    Hematocrit 38.4 37.0 - 47.0 %    MCV 91.2 81.4 - 97.8 fL    MCH 29.0 27.0 - 33.0 pg    MCHC 31.8 (L) 33.6 - 35.0 g/dL    " RDW 42.3 35.9 - 50.0 fL    Platelet Count 501 (H) 164 - 446 K/uL    MPV 10.7 9.0 - 12.9 fL    Neutrophils-Polys 69.50 44.00 - 72.00 %    Lymphocytes 18.70 (L) 22.00 - 41.00 %    Monocytes 7.30 0.00 - 13.40 %    Eosinophils 3.20 0.00 - 6.90 %    Basophils 0.30 0.00 - 1.80 %    Immature Granulocytes 1.00 (H) 0.00 - 0.90 %    Nucleated RBC 0.00 /100 WBC    Neutrophils (Absolute) 8.95 (H) 2.00 - 7.15 K/uL    Lymphs (Absolute) 2.41 1.00 - 4.80 K/uL    Monos (Absolute) 0.94 (H) 0.00 - 0.85 K/uL    Eos (Absolute) 0.41 0.00 - 0.51 K/uL    Baso (Absolute) 0.04 0.00 - 0.12 K/uL    Immature Granulocytes (abs) 0.13 (H) 0.00 - 0.11 K/uL    NRBC (Absolute) 0.00 K/uL    Hepatitis C Antibody Non-Reactive Non-Reactive    Hepatitis B Surface Antigen Non-Reactive Non-Reactive    Rubella IgG Antibody 20.80 IU/mL    Syphilis, Treponemal Qual Non-Reactive Non-Reactive   T.PALLIDUM AB MOMO (SCREENING)    Collection Time: 11/09/22 10:13 AM   Result Value Ref Range    Syphilis, Treponemal Qual Non-Reactive Non-Reactive   Chlamydia/GC, PCR (Urine)    Collection Time: 11/09/22 10:13 AM    Specimen: Urine   Result Value Ref Range    C. trachomatis by PCR Negative Negative    Gc By Dna Probe Negative Negative    Source Urine    URINE DRUG SCREEN W/CONF (AR)    Collection Time: 11/09/22 10:13 AM   Result Value Ref Range    Urine Amphetamine-Methamphetam Negative Cutoff 300 ng/mL    Barbiturates Negative Cutoff 200 ng/mL    Benzodiazepines Negative Cutoff 200 ng/mL    Propoxyphene Negative Cutoff 300 ng/mL    Cocaine Metabolite Negative Cutoff 150 ng/mL    Methadone Negative Cutoff 150 ng/mL    Codeine-Morphine Negative Cutoff 300 ng/mL    Phencyclidine -Pcp Negative Cutoff 25 ng/mL    Cannabinoid Metab Negative Cutoff 20 ng/mL    Drug Comment Urine Drugs See Note    GLUCOSE 1HR GESTATIONAL    Collection Time: 11/09/22 10:13 AM   Result Value Ref Range    Glucose, Post Dose 80 70 - 139 mg/dL   HIV AG/AB COMBO ASSAY SCREENING    Collection Time:  22 10:13 AM   Result Value Ref Range    HIV Ag/Ab Combo Assay Non-Reactive Non Reactive   OP Prenatal Panel-Blood Bank    Collection Time: 22 10:13 AM   Result Value Ref Range    ABO Grouping Only O     Rh Grouping Only POS     Antibody Screen Scrn NEG    URINE CULTURE(NEW)    Collection Time: 22 10:13 AM    Specimen: Urine   Result Value Ref Range    Significant Indicator NEG     Source UR     Site Clean Catch     Culture Result Usual urogenital maritza >100,000 cfu/mL    URINE MICROSCOPIC (W/UA)    Collection Time: 22 10:13 AM   Result Value Ref Range    WBC 20-50 (A) /hpf    RBC 0-2 /hpf    Bacteria Negative None /hpf    Epithelial Cells Few /hpf    Hyaline Cast 3-5 (A) /lpf   GRP B STREP, BY PCR (ZAVALA BROTH)    Collection Time: 23  1:42 PM    Specimen: Genital   Result Value Ref Range    Strep Gp B DNA PCR POSITIVE (A) Negative        Assessment:   Anacelia Markham Yogesh at 39w3d  Labor status: Early latent labor.  Obstetrical history significant for   Patient Active Problem List    Diagnosis Date Noted    Indication for care in labor or delivery 2023    Group beta Strep positive 2023    Encounter for supervision of normal first pregnancy in third trimester 10/14/2022   .      Plan:     Admit to L&D  GBS positive - For Antibiotics now  Discuss pain management options - desires epidural  Augment with pitocin  Anticipate     Evangelina Davis C.N.M.  Attending MD: Dr. Linares

## 2023-01-27 NOTE — PROGRESS NOTES
0700- Report received from Grace ACEVEDO. POC discussed.     1817- Baseline temperature prior to administration of cytotec, see flowsheets.     1845-  utilized through out pushing and delivery. Following delivery patient educated on need for fundal rubs and increased risk of bleeding. Patient attempted to kick RN and refused fundal rub. Attempted to take blood pressure, patient refusing. Patient educated on IM pitocin and sublingual cytotec using .     1900- Report given to KRISTINA Fournier. POC discussed.

## 2023-01-27 NOTE — PROGRESS NOTES
EDC -  EGA - 39-2    182 - Pt arrived to labor and delivery for contraction pain. Pt placed in room LDA2.  1833 - External monitors in place X2. Category I FHT at this time. VSS. Pt reports good FM. No complaints of contractions, ROM or vaginal bleeding. SVE 1-3/60/-2. FOB at bedside. POC discussed with pt and family members, all questions answered.     - Evangelina CNM at bedside. DC orders received. POC discussed.    - Reviewed DC instructions with pt. Pt and FOB expressed understanding. Pt ambulated off unit in stable condition.    

## 2023-01-28 ENCOUNTER — APPOINTMENT (OUTPATIENT)
Dept: RADIOLOGY | Facility: MEDICAL CENTER | Age: 18
End: 2023-01-28
Attending: INTERNAL MEDICINE
Payer: MEDICAID

## 2023-01-28 PROBLEM — J96.00 ACUTE RESPIRATORY FAILURE (HCC): Status: ACTIVE | Noted: 2023-01-28

## 2023-01-28 PROBLEM — O15.9 ECLAMPSIA: Status: ACTIVE | Noted: 2023-01-28

## 2023-01-28 LAB
ALBUMIN SERPL BCP-MCNC: 2.3 G/DL (ref 3.2–4.9)
ALBUMIN/GLOB SERPL: 0.8 G/DL
ALP SERPL-CCNC: 189 U/L (ref 30–99)
ALT SERPL-CCNC: 20 U/L (ref 2–50)
ANION GAP SERPL CALC-SCNC: 12 MMOL/L (ref 7–16)
ANION GAP SERPL CALC-SCNC: 13 MMOL/L (ref 7–16)
AST SERPL-CCNC: 54 U/L (ref 12–45)
BASE EXCESS BLDA CALC-SCNC: -8 MMOL/L (ref -4–3)
BASOPHILS # BLD AUTO: 0.4 % (ref 0–1.8)
BASOPHILS # BLD: 0.1 K/UL (ref 0–0.12)
BILIRUB SERPL-MCNC: 0.3 MG/DL (ref 0.1–1.5)
BLOOD CULTURE HOLD CXBCH: NORMAL
BLOOD CULTURE HOLD CXBCH: NORMAL
BODY TEMPERATURE: ABNORMAL DEGREES
BREATHS SETTING VENT: 20
BUN SERPL-MCNC: 11 MG/DL (ref 8–22)
BUN SERPL-MCNC: 14 MG/DL (ref 8–22)
CALCIUM ALBUM COR SERPL-MCNC: 8.2 MG/DL (ref 8.5–10.5)
CALCIUM SERPL-MCNC: 6.8 MG/DL (ref 8.5–10.5)
CALCIUM SERPL-MCNC: 7.2 MG/DL (ref 8.5–10.5)
CHLORIDE SERPL-SCNC: 110 MMOL/L (ref 96–112)
CHLORIDE SERPL-SCNC: 115 MMOL/L (ref 96–112)
CK SERPL-CCNC: 7970 U/L (ref 0–154)
CO2 BLDA-SCNC: 16 MMOL/L (ref 20–33)
CO2 SERPL-SCNC: 14 MMOL/L (ref 20–33)
CO2 SERPL-SCNC: 15 MMOL/L (ref 20–33)
CREAT SERPL-MCNC: 0.45 MG/DL (ref 0.5–1.4)
CREAT SERPL-MCNC: 0.64 MG/DL (ref 0.5–1.4)
DELSYS IDSYS: ABNORMAL
END TIDAL CARBON DIOXIDE IECO2: 28 MMHG
EOSINOPHIL # BLD AUTO: 0.01 K/UL (ref 0–0.51)
EOSINOPHIL NFR BLD: 0 % (ref 0–6.9)
ERYTHROCYTE [DISTWIDTH] IN BLOOD BY AUTOMATED COUNT: 46.8 FL (ref 35.9–50)
GFR SERPLBLD CREATININE-BSD FMLA CKD-EPI: 130 ML/MIN/1.73 M 2
GFR SERPLBLD CREATININE-BSD FMLA CKD-EPI: 142 ML/MIN/1.73 M 2
GLOBULIN SER CALC-MCNC: 2.8 G/DL (ref 1.9–3.5)
GLUCOSE SERPL-MCNC: 109 MG/DL (ref 65–99)
GLUCOSE SERPL-MCNC: 83 MG/DL (ref 65–99)
HCO3 BLDA-SCNC: 15.7 MMOL/L (ref 17–25)
HCT VFR BLD AUTO: 32.5 % (ref 37–47)
HGB BLD-MCNC: 10.5 G/DL (ref 12–16)
HOROWITZ INDEX BLDA+IHG-RTO: 383 MM[HG]
IMM GRANULOCYTES # BLD AUTO: 0.44 K/UL (ref 0–0.11)
IMM GRANULOCYTES NFR BLD AUTO: 1.8 % (ref 0–0.9)
LACTATE SERPL-SCNC: 0.9 MMOL/L (ref 0.5–2)
LACTATE SERPL-SCNC: 1.1 MMOL/L (ref 0.5–2)
LACTATE SERPL-SCNC: 1.4 MMOL/L (ref 0.5–2)
LYMPHOCYTES # BLD AUTO: 1.19 K/UL (ref 1–4.8)
LYMPHOCYTES NFR BLD: 4.9 % (ref 22–41)
MAGNESIUM SERPL-MCNC: 5.4 MG/DL (ref 1.5–2.5)
MAGNESIUM SERPL-MCNC: 5.6 MG/DL (ref 1.5–2.5)
MAGNESIUM SERPL-MCNC: 6.2 MG/DL (ref 1.5–2.5)
MAGNESIUM SERPL-MCNC: 6.7 MG/DL (ref 1.5–2.5)
MCH RBC QN AUTO: 28.5 PG (ref 27–33)
MCHC RBC AUTO-ENTMCNC: 32.3 G/DL (ref 33.6–35)
MCV RBC AUTO: 88.1 FL (ref 81.4–97.8)
MODE IMODE: ABNORMAL
MONOCYTES # BLD AUTO: 1.5 K/UL (ref 0–0.85)
MONOCYTES NFR BLD AUTO: 6.2 % (ref 0–13.4)
NEUTROPHILS # BLD AUTO: 20.93 K/UL (ref 2–7.15)
NEUTROPHILS NFR BLD: 86.7 % (ref 44–72)
NRBC # BLD AUTO: 0.02 K/UL
NRBC BLD-RTO: 0.1 /100 WBC
O2/TOTAL GAS SETTING VFR VENT: 40 %
PCO2 BLDA: 27.4 MMHG (ref 26–37)
PCO2 TEMP ADJ BLDA: 29 MMHG (ref 26–37)
PEEP END EXPIRATORY PRESSURE IPEEP: 8 CMH20
PH BLDA: 7.37 [PH] (ref 7.4–7.5)
PH TEMP ADJ BLDA: 7.35 [PH] (ref 7.4–7.5)
PHOSPHATE SERPL-MCNC: 3.2 MG/DL (ref 2.5–4.5)
PLATELET # BLD AUTO: 195 K/UL (ref 164–446)
PMV BLD AUTO: 11.4 FL (ref 9–12.9)
PO2 BLDA: 153 MMHG (ref 64–87)
PO2 TEMP ADJ BLDA: 161 MMHG (ref 64–87)
POTASSIUM SERPL-SCNC: 3.4 MMOL/L (ref 3.6–5.5)
POTASSIUM SERPL-SCNC: 4.1 MMOL/L (ref 3.6–5.5)
PROT SERPL-MCNC: 5.1 G/DL (ref 6–8.2)
RBC # BLD AUTO: 3.69 M/UL (ref 4.2–5.4)
SAO2 % BLDA: 99 % (ref 93–99)
SODIUM SERPL-SCNC: 136 MMOL/L (ref 135–145)
SODIUM SERPL-SCNC: 143 MMOL/L (ref 135–145)
SPECIMEN DRAWN FROM PATIENT: ABNORMAL
TIDAL VOLUME IVT: 400 ML
WBC # BLD AUTO: 24.2 K/UL (ref 4.8–10.8)

## 2023-01-28 PROCEDURE — 700111 HCHG RX REV CODE 636 W/ 250 OVERRIDE (IP)

## 2023-01-28 PROCEDURE — 94003 VENT MGMT INPAT SUBQ DAY: CPT

## 2023-01-28 PROCEDURE — 84100 ASSAY OF PHOSPHORUS: CPT

## 2023-01-28 PROCEDURE — 700111 HCHG RX REV CODE 636 W/ 250 OVERRIDE (IP): Performed by: INTERNAL MEDICINE

## 2023-01-28 PROCEDURE — 94799 UNLISTED PULMONARY SVC/PX: CPT

## 2023-01-28 PROCEDURE — 700102 HCHG RX REV CODE 250 W/ 637 OVERRIDE(OP): Performed by: ADVANCED PRACTICE MIDWIFE

## 2023-01-28 PROCEDURE — 4A10X4Z MONITORING OF CENTRAL NERVOUS ELECTRICAL ACTIVITY, EXTERNAL APPROACH: ICD-10-PCS | Performed by: STUDENT IN AN ORGANIZED HEALTH CARE EDUCATION/TRAINING PROGRAM

## 2023-01-28 PROCEDURE — 85025 COMPLETE CBC W/AUTO DIFF WBC: CPT

## 2023-01-28 PROCEDURE — 93005 ELECTROCARDIOGRAM TRACING: CPT | Performed by: INTERNAL MEDICINE

## 2023-01-28 PROCEDURE — 770002 HCHG ROOM/CARE - OB PRIVATE (112)

## 2023-01-28 PROCEDURE — 99254 IP/OBS CNSLTJ NEW/EST MOD 60: CPT | Mod: 25 | Performed by: PSYCHIATRY & NEUROLOGY

## 2023-01-28 PROCEDURE — A9270 NON-COVERED ITEM OR SERVICE: HCPCS | Performed by: ADVANCED PRACTICE MIDWIFE

## 2023-01-28 PROCEDURE — 83735 ASSAY OF MAGNESIUM: CPT | Mod: 91

## 2023-01-28 PROCEDURE — A9579 GAD-BASE MR CONTRAST NOS,1ML: HCPCS | Performed by: INTERNAL MEDICINE

## 2023-01-28 PROCEDURE — 700105 HCHG RX REV CODE 258: Performed by: INTERNAL MEDICINE

## 2023-01-28 PROCEDURE — 95714 VEEG EA 12-26 HR UNMNTR: CPT | Performed by: STUDENT IN AN ORGANIZED HEALTH CARE EDUCATION/TRAINING PROGRAM

## 2023-01-28 PROCEDURE — 82803 BLOOD GASES ANY COMBINATION: CPT

## 2023-01-28 PROCEDURE — 59409 OBSTETRICAL CARE: CPT

## 2023-01-28 PROCEDURE — 70553 MRI BRAIN STEM W/O & W/DYE: CPT

## 2023-01-28 PROCEDURE — 83605 ASSAY OF LACTIC ACID: CPT

## 2023-01-28 PROCEDURE — 700111 HCHG RX REV CODE 636 W/ 250 OVERRIDE (IP): Performed by: STUDENT IN AN ORGANIZED HEALTH CARE EDUCATION/TRAINING PROGRAM

## 2023-01-28 PROCEDURE — 700117 HCHG RX CONTRAST REV CODE 255: Performed by: INTERNAL MEDICINE

## 2023-01-28 PROCEDURE — 71045 X-RAY EXAM CHEST 1 VIEW: CPT

## 2023-01-28 PROCEDURE — 304965 HCHG RECOVERY SERVICES

## 2023-01-28 PROCEDURE — 94150 VITAL CAPACITY TEST: CPT

## 2023-01-28 PROCEDURE — 80048 BASIC METABOLIC PNL TOTAL CA: CPT

## 2023-01-28 PROCEDURE — 700102 HCHG RX REV CODE 250 W/ 637 OVERRIDE(OP): Performed by: INTERNAL MEDICINE

## 2023-01-28 PROCEDURE — 99291 CRITICAL CARE FIRST HOUR: CPT | Performed by: INTERNAL MEDICINE

## 2023-01-28 PROCEDURE — 99291 CRITICAL CARE FIRST HOUR: CPT | Mod: GC | Performed by: INTERNAL MEDICINE

## 2023-01-28 PROCEDURE — 80053 COMPREHEN METABOLIC PANEL: CPT

## 2023-01-28 PROCEDURE — 95700 EEG CONT REC W/VID EEG TECH: CPT | Performed by: STUDENT IN AN ORGANIZED HEALTH CARE EDUCATION/TRAINING PROGRAM

## 2023-01-28 PROCEDURE — 37799 UNLISTED PX VASCULAR SURGERY: CPT

## 2023-01-28 PROCEDURE — 95720 EEG PHY/QHP EA INCR W/VEEG: CPT | Performed by: STUDENT IN AN ORGANIZED HEALTH CARE EDUCATION/TRAINING PROGRAM

## 2023-01-28 PROCEDURE — 700111 HCHG RX REV CODE 636 W/ 250 OVERRIDE (IP): Performed by: OBSTETRICS & GYNECOLOGY

## 2023-01-28 PROCEDURE — 82550 ASSAY OF CK (CPK): CPT

## 2023-01-28 PROCEDURE — A9270 NON-COVERED ITEM OR SERVICE: HCPCS | Performed by: INTERNAL MEDICINE

## 2023-01-28 RX ORDER — PHENYLEPHRINE HCL IN 0.9% NACL 0.5 MG/5ML
SYRINGE (ML) INTRAVENOUS
Status: DISCONTINUED
Start: 2023-01-28 | End: 2023-01-28

## 2023-01-28 RX ORDER — POTASSIUM CHLORIDE 7.45 MG/ML
10 INJECTION INTRAVENOUS ONCE
Status: COMPLETED | OUTPATIENT
Start: 2023-01-28 | End: 2023-01-28

## 2023-01-28 RX ORDER — SODIUM CHLORIDE, SODIUM LACTATE, POTASSIUM CHLORIDE, CALCIUM CHLORIDE 600; 310; 30; 20 MG/100ML; MG/100ML; MG/100ML; MG/100ML
INJECTION, SOLUTION INTRAVENOUS CONTINUOUS
Status: DISCONTINUED | OUTPATIENT
Start: 2023-01-28 | End: 2023-01-28

## 2023-01-28 RX ORDER — POTASSIUM CHLORIDE 14.9 MG/ML
20 INJECTION INTRAVENOUS ONCE
Status: COMPLETED | OUTPATIENT
Start: 2023-01-28 | End: 2023-01-28

## 2023-01-28 RX ORDER — IBUPROFEN 800 MG/1
800 TABLET ORAL EVERY 8 HOURS PRN
Status: DISCONTINUED | OUTPATIENT
Start: 2023-01-28 | End: 2023-01-30 | Stop reason: HOSPADM

## 2023-01-28 RX ORDER — LEVETIRACETAM 500 MG/5ML
1500 INJECTION, SOLUTION, CONCENTRATE INTRAVENOUS EVERY 12 HOURS
Status: DISCONTINUED | OUTPATIENT
Start: 2023-01-28 | End: 2023-01-29

## 2023-01-28 RX ORDER — DOCUSATE SODIUM 100 MG/1
100 CAPSULE, LIQUID FILLED ORAL 2 TIMES DAILY PRN
Status: DISCONTINUED | OUTPATIENT
Start: 2023-01-28 | End: 2023-01-30 | Stop reason: HOSPADM

## 2023-01-28 RX ORDER — MIDAZOLAM HYDROCHLORIDE 1 MG/ML
1-10 INJECTION INTRAMUSCULAR; INTRAVENOUS
Status: COMPLETED | OUTPATIENT
Start: 2023-01-28 | End: 2023-01-28

## 2023-01-28 RX ORDER — SODIUM CHLORIDE, SODIUM LACTATE, POTASSIUM CHLORIDE, CALCIUM CHLORIDE 600; 310; 30; 20 MG/100ML; MG/100ML; MG/100ML; MG/100ML
INJECTION, SOLUTION INTRAVENOUS PRN
Status: DISCONTINUED | OUTPATIENT
Start: 2023-01-28 | End: 2023-01-30 | Stop reason: HOSPADM

## 2023-01-28 RX ORDER — ACETAMINOPHEN 500 MG
1000 TABLET ORAL EVERY 6 HOURS PRN
Status: DISCONTINUED | OUTPATIENT
Start: 2023-01-28 | End: 2023-01-30 | Stop reason: HOSPADM

## 2023-01-28 RX ADMIN — LEVETIRACETAM 1500 MG: 100 INJECTION, SOLUTION INTRAVENOUS at 17:09

## 2023-01-28 RX ADMIN — DOCUSATE SODIUM 100 MG: 100 CAPSULE, LIQUID FILLED ORAL at 23:20

## 2023-01-28 RX ADMIN — MIDAZOLAM HYDROCHLORIDE 4 MG: 1 INJECTION, SOLUTION INTRAMUSCULAR; INTRAVENOUS at 11:29

## 2023-01-28 RX ADMIN — AMPICILLIN AND SULBACTAM 3 G: 1; 2 INJECTION, POWDER, FOR SOLUTION INTRAMUSCULAR; INTRAVENOUS at 00:17

## 2023-01-28 RX ADMIN — PROPOFOL 50 MCG/KG/MIN: 10 INJECTION, EMULSION INTRAVENOUS at 04:14

## 2023-01-28 RX ADMIN — SENNOSIDES AND DOCUSATE SODIUM 2 TABLET: 50; 8.6 TABLET ORAL at 17:09

## 2023-01-28 RX ADMIN — ACETAMINOPHEN 1000 MG: 500 TABLET ORAL at 23:20

## 2023-01-28 RX ADMIN — POTASSIUM CHLORIDE 20 MEQ: 14.9 INJECTION, SOLUTION INTRAVENOUS at 04:10

## 2023-01-28 RX ADMIN — FAMOTIDINE 20 MG: 20 TABLET, FILM COATED ORAL at 17:09

## 2023-01-28 RX ADMIN — GADOTERIDOL 15 ML: 279.3 INJECTION, SOLUTION INTRAVENOUS at 12:44

## 2023-01-28 RX ADMIN — SODIUM CHLORIDE 2000 ML: 9 INJECTION, SOLUTION INTRAVENOUS at 00:35

## 2023-01-28 RX ADMIN — FAMOTIDINE 20 MG: 10 INJECTION, SOLUTION INTRAVENOUS at 00:23

## 2023-01-28 RX ADMIN — POTASSIUM CHLORIDE 10 MEQ: 7.46 INJECTION, SOLUTION INTRAVENOUS at 06:35

## 2023-01-28 RX ADMIN — FENTANYL CITRATE 200 MCG: 50 INJECTION, SOLUTION INTRAMUSCULAR; INTRAVENOUS at 05:26

## 2023-01-28 RX ADMIN — LEVETIRACETAM 1500 MG: 100 INJECTION, SOLUTION INTRAVENOUS at 06:52

## 2023-01-28 RX ADMIN — IBUPROFEN 800 MG: 800 TABLET, FILM COATED ORAL at 23:20

## 2023-01-28 RX ADMIN — MAGNESIUM SULFATE HEPTAHYDRATE 2 G/HR: 40 INJECTION, SOLUTION INTRAVENOUS at 15:53

## 2023-01-28 RX ADMIN — SODIUM CHLORIDE, POTASSIUM CHLORIDE, SODIUM LACTATE AND CALCIUM CHLORIDE: 600; 310; 30; 20 INJECTION, SOLUTION INTRAVENOUS at 15:54

## 2023-01-28 RX ADMIN — FENTANYL CITRATE 100 MCG: 50 INJECTION, SOLUTION INTRAMUSCULAR; INTRAVENOUS at 04:32

## 2023-01-28 RX ADMIN — FAMOTIDINE 20 MG: 10 INJECTION, SOLUTION INTRAVENOUS at 05:11

## 2023-01-28 RX ADMIN — SODIUM CHLORIDE: 9 INJECTION, SOLUTION INTRAVENOUS at 00:05

## 2023-01-28 RX ADMIN — AMPICILLIN AND SULBACTAM 3 G: 1; 2 INJECTION, POWDER, FOR SOLUTION INTRAMUSCULAR; INTRAVENOUS at 05:18

## 2023-01-28 ASSESSMENT — PAIN DESCRIPTION - PAIN TYPE
TYPE: ACUTE PAIN

## 2023-01-28 ASSESSMENT — PULMONARY FUNCTION TESTS
FVC: 0.9
FVC: 1.2

## 2023-01-28 NOTE — PROGRESS NOTES
[facility] ICU Progress Note      Admit Date: 1/27/2023    Resident(s): Pawan Mendez M.D.   Attending:  LULU MOORE/ Dr. Herron    Patient ID:    Name:  Pedro Parker     YOB: 2005  Age:  17 y.o.  female   MRN:  [id number]    Hospital Course (carried forward and updated):    Pedro Zuñiga is a 17 y.o. previously healthy female with  uncomplicated vaginal delivery JAN 27th, post partum she developed hypertension, fever, encephalopathy and then tonic-clonic seizures, required urgent intubation, likely eclampsia. Seizure aborted with benzodiazepine and magnesium. Brought to ICU for further monitoring, neurology consulted.    Consultants:  Critical Care  OB GYN  Neurology       Interval Events:    Overnight without EEG evidence or any clinical seizure, waking up off sedation this morning. Tentative plan to trial of extubation. Pending MRI head, Neuro recommends continue keppra for short term. Discontinuing IVF, continue to monitor.        Vitals Range last 24h:  Temp:  [36.2 °C (97.2 °F)-41.8 °C (107.3 °F)] 36.5 °C (97.7 °F)  Pulse:  [0-194] 99  Resp:  [18-30] 27  BP: ()/() 101/54  SpO2:  [70 %-99 %] 98 %      Intake/Output Summary (Last 24 hours) at 1/28/2023 0742  Last data filed at 1/28/2023 0704  Gross per 24 hour   Intake 3669.01 ml   Output 1550 ml   Net 2119.01 ml        Review of Systems   Reason unable to perform ROS: intubated.     PHYSICAL EXAM:  Vitals:    01/28/23 0600 01/28/23 0615 01/28/23 0630 01/28/23 0659   BP: 96/52 99/54 101/54    Pulse: (!) 103 100 (!) 101 99   Resp: 20 (!) 21 20 (!) 27   Temp:       TempSrc:       SpO2: 97% 98% 98% 98%   Weight:       Height:        Body mass index is 36.4 kg/m².    O2 therapy: Pulse Oximetry: 98 %, O2 (LPM): 0, O2 Delivery Device: Ventilator    Date 01/28/23 0700 - 01/29/23 0659   Shift 5398-3377 4360-4507 1988-3732 24 Hour Total   INTAKE   I.V. 1162.2   1162.2     Magnesium Sulfate Volume 586.6   586.6      Propofol Volume 8.8   8.8     Volume (mL) (NS infusion) 566.7   566.7   Shift Total 1162.2   1162.2   OUTPUT   Shift Total       NET 1162.2   1162.2        Physical Exam  Constitutional:       General: She is not in acute distress.     Appearance: She is not ill-appearing or toxic-appearing.      Comments: Intubated, not sedated, wakes up to voice, tracks, following commands in Japanese   HENT:      Nose: No congestion.      Mouth/Throat:      Mouth: Mucous membranes are moist.      Pharynx: No oropharyngeal exudate.   Eyes:      General: No scleral icterus.     Conjunctiva/sclera: Conjunctivae normal.      Pupils: Pupils are equal, round, and reactive to light.   Cardiovascular:      Rate and Rhythm: Regular rhythm. Tachycardia present.      Heart sounds: No murmur heard.  Pulmonary:      Effort: No respiratory distress.      Breath sounds: No wheezing or rales.   Abdominal:      Tenderness: There is no guarding.   Musculoskeletal:      Cervical back: No rigidity.      Right lower leg: No edema.      Left lower leg: No edema.   Skin:     Coloration: Skin is not jaundiced.   Neurological:      General: No focal deficit present.   Psychiatric:         Behavior: Behavior normal.       Recent Labs     01/27/23 1945 01/27/23 2242 01/28/23  0411   [id number] 7.36*  --   --    [id number] 22.5*  --   --    [id number] 155.7*  --   --    [id number] 97.9  --   --    ARTHCO3 12*  --   --    ARTBE -11*  --   --    ISTATAPH  --  7.344* 7.366*   ISTATAPCO2  --  31.5 27.4   ISTATAPO2  --  179* 153*   ISTATATCO2  --  18* 16*   [id number]  --  100* 99   ISTATARTHCO3  --  17.2 15.7*   ISTATARTBE  --  -7* -8*   ISTATTEMP  --  38.4 C 38.3 C   ISTATFIO2  --  50 40   ISTATSPEC  --  Arterial Arterial   ISTATAPHTC  --  7.324* 7.347*   [id number]  --  187* 161*     Recent Labs     01/27/23 1945 01/28/23  0140   SODIUM 137 136   POTASSIUM 4.1 3.4*   CHLORIDE 106 110   CO2 12* 14*   BUN 13 14   CREATININE 1.00 0.64   MAGNESIUM  --  5.6*    PHOSPHORUS  --  3.2   CALCIUM 8.9 6.8*     Recent Labs     01/27/23 1945 01/28/23  0140   ALTSGPT 11 20   ASTSGOT 21 54*   ALKPHOSPHAT 235* 189*   TBILIRUBIN 0.2 0.3   GLUCOSE 123* 109*     Recent Labs     01/27/23 0423 01/27/23 1945 01/28/23 0140   RBC 4.69 4.28 3.69*   HEMOGLOBIN 13.3 12.2 10.5*   HEMATOCRIT 41.6 38.2 32.5*   PLATELETCT 281 303 195   PROTHROMBTM  --  13.6  --    INR  --  1.05  --      Recent Labs     01/27/23 0423 01/27/23 1945 01/28/23 0140   WBC 12.8* 28.3* 24.2*   NEUTSPOLYS 59.30 77.20* 86.70*   LYMPHOCYTES 29.90 15.80* 4.90*   MONOCYTES 7.50 5.20 6.20   EOSINOPHILS 2.40 0.00 0.00   BASOPHILS 0.30 0.20 0.40   ASTSGOT  --  21 54*   ALTSGPT  --  11 20   ALKPHOSPHAT  --  235* 189*   TBILIRUBIN  --  0.2 0.3       Meds:   fentaNYL  100 mcg      And    fentaNYL  200 mcg      And    fentaNYL   Stopped (01/28/23 0400)    And    propofol  0-80 mcg/kg/min (Order-Specific) Stopped (01/28/23 0537)    levETIRAcetam (Keppra) IV  1,500 mg      acetaminophen (TYLENOL) suppository  975 mg      Respiratory Therapy Consult        famotidine  20 mg      Or    famotidine  20 mg      senna-docusate  2 Tablet      And    polyethylene glycol/lytes  1 Packet      And    magnesium hydroxide  30 mL      And    bisacodyl  10 mg      MD Alert...Adult ICU Electrolyte Replacement per Pharmacy        lidocaine  2 mL      ipratropium-albuterol  3 mL      HYDROmorphone  0.5-2 mg      calcium GLUConate  1 g      magnesium sulfate  2 g/hr 2 g/hr (01/28/23 0704)    NS   125 mL/hr at 01/28/23 0704    niCARdipine 25 mg/250 mL (0.1 mg/mL) standard infusion  0-15 mg/hr Stopped (01/27/23 2317)    LORazepam  2 mg      ampicillin-sulbactam (UNASYN) IV  3 g Stopped (01/28/23 0548)        Procedures:  Vaginal Delivery JAN 28    Imaging:  DX-CHEST-PORTABLE (1 VIEW)   Final Result         1.  No acute cardiopulmonary disease.      CT-CTA HEAD WITH & W/O-POST PROCESS   Final Result         1.  No large vessel occlusion or aneurysm  identified   2.  Diffuse sulcal effacement with slitlike ventricles, appearance favoring cerebral edema, appears similar to prior study.      CT-CTV HEAD WITH & W/O POST PROCESS   Final Result         1.  There is no evidence of dural venous sinus thrombosis.      DX-CHEST-PORTABLE (1 VIEW)   Final Result         1.  No acute cardiopulmonary disease.      CT-CTA CHEST PULMONARY ARTERY W/ RECONS   Final Result         1.  No large central pulmonary embolus is appreciated, evaluation of the distal segmental and subsegmental branches is essentially nondiagnostic due to motion artifacts. Additional imaging would be required for definitive exclusion of small distal    pulmonary emboli.   2.  Hazy linear densities in bilateral lung bases suggests atelectasis, component of infiltrate not excluded.   3.  Hepatomegaly and diffuse hepatic steatosis      CT-HEAD W/O   Final Result         1.  Diffuse sulcal effacement and slitlike bilateral ventricles. Appearance concerning for diffuse cerebral edema, which in the peripartum period would be concerning for eclampsia.      These findings were discussed with the patient's clinician, Dr. Taylor, on 1/27/2023 9:13 PM.         DX-CHEST-PORTABLE (1 VIEW)   Final Result         1.  No acute cardiopulmonary disease.   2.  Right mainstem intubation, recommend withdrawal 3 to 4 cm.      These findings were discussed with the patient's clinician, Will Meyer, on 1/27/2023 8:42 PM.      MR-BRAIN-WITH & W/O    (Results Pending)       ASSESSEMENT and PLAN:    Eclampsia  Assessment & Plan  Tachycardia, hypertension, encephalopathy and then seizure. Responded to Magnesium and lorazepam - seizure aborted.   -Continued magnesium infusion, appreciate OB GYN recs  -Seizure precautions - see seizure for details about management  -BP control see hypertension management, SBP goal less than 160      Acute respiratory failure (HCC)  Assessment & Plan  Intubated for airway protection during  seizure event  -Possible aspiration event, continue empiric antibiotic therapy, continue to monitor but can consider 5 to 7 day course, unasyn currently (1/27-)  -ABCDEF, pulmonary toilet, SAT SBT  -Trial extubation JAN 28    Hypertension  Assessment & Plan  -Nicardipine drip SBP goal less than 160    Cerebral edema (HCC)  Assessment & Plan  Possible PRES, most likely secondary to eclampsia  -See management of seizure    Seizure (HCC)  Assessment & Plan  Likely secondary to Eclampsia, but PRES not ruled out, some edema on imaging. Neurology consulted  -No clinical or EEG seizure after initial event  -Neuro recommends continue Keppra for now  -Follow up MRI head  -Magnesium per OB GYN recs        DISPO: ICU    CODE STATUS: FULL    Quality Measures:  Feeding: Advance diet as tolerated if extubation successful - otherwise NPO consider tube feeds in AM  Analgesia: prn tylenol  Sedation: -  Thromboprophylaxis: lovenox  Head of bed: >30 degrees  Ulcer prophylaxis: famotidine  Glycemic control: Correctional:  -/ Basal: -  Bowel care: bowel regimen prn  Indwelling lines: PIV, axillary arterial line, triple lumen IJ right.  Deescalation of antibiotics: Unasyn - ?aspiration PNA - tentative plan 7 days total antbx.      Pawan Mendez M.D.

## 2023-01-28 NOTE — PROGRESS NOTES
RRT charting. Pt up to floor on CIC. MD at bedside. Temp sensing ghosh inserted without complications.

## 2023-01-28 NOTE — PROGRESS NOTES
MRI screening form completed with  at bedside with . Spoke with MRI tech, aiming for scan around 1045 this morning.     1130-pt to MRI with RN, CNA, and RT

## 2023-01-28 NOTE — ANESTHESIA TIME REPORT
Anesthesia Start and Stop Event Times     Date Time Event    1/27/2023 0516 Ready for Procedure     0523 Anesthesia Start     1803 Anesthesia Stop        Responsible Staff  01/27/23    Name Role Begin End    Jc Verma M.D. Anesth 0523 0700    Bryan Ivey M.D. Anesth 0700 1803        Overtime Reason:  no overtime (within assigned shift)    Comments:

## 2023-01-28 NOTE — ASSESSMENT & PLAN NOTE
Intubated for airway protection during seizure event  -Possible aspiration event, continue empiric antibiotic therapy, continue to monitor but can consider 5 to 7 day course, unasyn currently (1/27-)  -ABCDEF, pulmonary toilet, SAT SBT  -Trial extubation JAN 28

## 2023-01-28 NOTE — PROGRESS NOTES
Central line placement:    Dr. Taylor  Primary KRISTINA Peck    Time out at . Name, MRN, , and allergies verified. All in agreement.     Guidewire out at .

## 2023-01-28 NOTE — CONSULTS
CRITICAL CARE MEDICINE ATTENDING     Date of admission  2023    Chief Complaint  17 y.o. female admitted 2023 with prodromal labor.    History of Present Illness      This lady has no significant past medical history.  She was admitted in the wee hours earlier today with prodromal labor.  She is now .  She had an uncomplicated vaginal delivery of a healthy girl at 1803 hrs.  She has received oxytocin and misoprostol.  Subsequent to her delivery, she developed confusion and became combative.  She then had a witnessed generalized tonic-clonic seizure.  Her temperature following her seizure was 107.3.  She was tachycardic, tachypneic and hypertensive.  She received a total of 4 mg of lorazepam.  Her temperature improved to 103.5.  She received a loading dose of 6 g of magnesium sulfate.  She was intubated for airway protection and I was asked to assist in her care and management.  A stat CT scan of the head shows diffuse cerebral edema.  At no time did this lady suffer a cardiac arrest or have any evidence of hypotension or hypoxemia.      Review of Systems  Review of Systems   Unable to perform ROS: Acuity of condition     Vital Signs for the last 24 hours  Temp:  [36.2 °C (97.2 °F)-41.8 °C (107.3 °F)] 37.8 °C (100 °F)  Pulse:  [0-194] 156  Resp:  [16-28] 20  BP: (105-176)/() 143/72  SpO2:  [70 %-99 %] 97 %    Hemodynamic parameters for the last 24 hours       Vent Settings for the last 24 hours  Vent Mode: APVCMV  Rate (breaths/min): 24  Vt Target (mL): 400  PEEP/CPAP: 8  Control VTE (exp VT): 404    Physical Exam  Physical Exam  Constitutional:       Appearance: She is obese.      Comments: On ventilator   HENT:      Head: Normocephalic.      Mouth/Throat:      Pharynx: Oropharynx is clear.   Eyes:      Pupils: Pupils are equal, round, and reactive to light.   Cardiovascular:      Comments: Sinus tachycardia  Pulmonary:      Breath sounds: Rales (Few crackles at bases) present. No wheezing.    Abdominal:      Comments: A hard uterus is palpated in the lower abdomen   Musculoskeletal:      Cervical back: Normal range of motion.   Skin:     General: Skin is warm.   Neurological:      Comments: PERRL.  Sedated.       Medications  Current Facility-Administered Medications   Medication Dose Route Frequency Provider Last Rate Last Admin    BUPIVACAINE HCL (PF) 0.25 % INJ SOLN             acetaminophen (TYLENOL) suppository 975 mg  975 mg Rectal Q4HRS PRN Will Meyer M.D.   650 mg at 01/27/23 1943    Respiratory Therapy Consult   Nebulization Continuous RT Samuel Taylor M.D.        famotidine (PEPCID) tablet 20 mg  20 mg Enteral Tube Q12HRS Samuel Taylor M.D.        Or    famotidine (PEPCID) injection 20 mg  20 mg Intravenous Q12HRS Samuel Taylor M.D.        senna-docusate (PERICOLACE or SENOKOT S) 8.6-50 MG per tablet 2 Tablet  2 Tablet Enteral Tube BID Samuel Taylor M.D.        And    polyethylene glycol/lytes (MIRALAX) PACKET 1 Packet  1 Packet Enteral Tube QDAY PRN Samuel Taylor M.D.        And    magnesium hydroxide (MILK OF MAGNESIA) suspension 30 mL  30 mL Enteral Tube QDAY PRN Samuel Taylor M.D.        And    bisacodyl (DULCOLAX) suppository 10 mg  10 mg Rectal QDAY PRN Samuel Taylor M.D. MD Alert...ICU Electrolyte Replacement per Pharmacy   Other PHARMACY TO DOSE Samuel Taylor M.D.        lidocaine (XYLOCAINE) 1 % injection 2 mL  2 mL Tracheal Tube Q30 MIN PRN Samuel Taylor M.D.        ipratropium-albuterol (DUONEB) nebulizer solution  3 mL Nebulization Q2HRS PRN (RT) Samuel Taylor M.D.        HYDROmorphone (Dilaudid) injection 0.5-2 mg  0.5-2 mg Intravenous Q HOUR PRN Samuel Taylor M.D.        propofol (DIPRIVAN) injection  0-80 mcg/kg/min (Order-Specific) Intravenous Continuous Samuel Taylor M.D. 16 mL/hr at 01/27/23 2100 60 mcg/kg/min at 01/27/23 2100    calcium GLUConate  injection 1 g  1 g Intravenous Once PRN Will Meyer M.D.        magnesium sulfate 40 g/1000mL infusion  2 g/hr Intravenous Continuous Will Meyer M.D. 50 mL/hr at 01/27/23 1920 New Bag at 01/27/23 1920    NS infusion 2,000 mL  2,000 mL Intravenous Once Samuel Taylor M.D.        NS infusion   Intravenous Continuous Samuel Taylor M.D.        niCARdipine (CARDENE) 25 mg in  mL Standard Infusion  0-15 mg/hr Intravenous Continuous Samuel Taylor M.D.   Dose not Required at 01/27/23 2315    LORazepam (ATIVAN) injection 2 mg  2 mg Intravenous Q2HRS PRN Samuel Taylor M.D.        [START ON 1/28/2023] ampicillin/sulbactam (UNASYN) 3 g in  mL IVPB  3 g Intravenous Q6HRS Samuel Taylor M.D.           Fluids    Intake/Output Summary (Last 24 hours) at 1/27/2023 2344  Last data filed at 1/27/2023 1803  Gross per 24 hour   Intake --   Output 150 ml   Net -150 ml       Laboratory  Recent Labs     01/27/23 1945 01/27/23 2242   RXASY42H 7.36*  --    [id number] 22.5*  --    [id number] 155.7*  --    DGRL1UCJ 97.9  --    ARTHCO3 12*  --    ARTBE -11*  --    ISTATAPH  --  7.344*   ISTATAPCO2  --  31.5   ISTATAPO2  --  179*   ISTATATCO2  --  18*   UAQTHUB0FSF  --  100*   ISTATARTHCO3  --  17.2   ISTATARTBE  --  -7*   ISTATTEMP  --  38.4 C   ISTATFIO2  --  50   ISTATSPEC  --  Arterial   ISTATAPHTC  --  7.324*   MNUOULFF2NJ  --  187*     Recent Labs     01/27/23 1945   SODIUM 137   POTASSIUM 4.1   CHLORIDE 106   CO2 12*   BUN 13   CREATININE 1.00   CALCIUM 8.9     Recent Labs     01/27/23 1945   ALTSGPT 11   ASTSGOT 21   ALKPHOSPHAT 235*   TBILIRUBIN 0.2   GLUCOSE 123*     Recent Labs     01/27/23  0423 01/27/23  1945   WBC 12.8* 28.3*   NEUTSPOLYS 59.30 77.20*   LYMPHOCYTES 29.90 15.80*   MONOCYTES 7.50 5.20   EOSINOPHILS 2.40 0.00   BASOPHILS 0.30 0.20   ASTSGOT  --  21   ALTSGPT  --  11   ALKPHOSPHAT  --  235*   TBILIRUBIN  --  0.2     Recent Labs      23  0423 23   RBC 4.69 4.28   HEMOGLOBIN 13.3 12.2   HEMATOCRIT 41.6 38.2   PLATELETCT 281 303   PROTHROMBTM  --  13.6   INR  --  1.05       Imaging  CT:    CT images personally reviewed.  CT head reveals evidence of cerebral edema.  CTA of the chest shows no evidence of pulmonary embolism, but there were increased opacities in both lung bases.    Assessment/Plan      Seizure   Differential diagnosis includes eclampsia, PRES   Continuous video EEG   Magnesium infusion   Sedate with propofol   MRI brain   Neurology consultation    Diffuse cerebral edema   Differential diagnosis includes eclampsia, PRES, cerebral venous thrombosis   STAT CTA/CTV head   MRI brain   Strict blood pressure control with goal SBP less than 160    Query eclampsia   She has received a loading dose of 6 g of IV magnesium sulfate   Continuous magnesium sulfate infusion at 2 g/h    Acute hypoxemic respiratory failure   Intubated    ABCDEF bundle   Not a candidate for SAT/SBT   Mobility level 1    Fever   Marked pyrexia with temperature of 107.3 immediately following generalized tonic-clonic seizure   Query secondary to seizure, misoprostol    Query adverse medication effect   As mentioned above, misoprostol can rarely cause fever   Oxytocin may rarely cause a hypertensive crisis    Evidence of aspiration   Culture blood and sputum   Begin Unasyn, 3 g IV every 6 hours    S/P normal spontaneous vaginal delivery of healthy girl (39w3d)   This lady is       VTE:  Heparin  Ulcer: H2 Antagonist  Lines: Central Line  Ongoing indication addressed, Arterial Line  Ongoing indication addressed, and López Catheter  Ongoing indication addressed    I have performed a physical exam and reviewed and updated ROS and Plan today (2023). In review of yesterday's note (2023), there are no changes except as documented above.     I have assessed and reassessed her respiratory status with ventilator adjustments, airway mechanics,  ventilator waveforms, blood pressure, hemodynamics, cardiovascular status and her neurologic status.  She is at increased risk for worsening respiratory, cardiovascular and CNS system dysfunction.    Discussed patient condition and risk of morbidity and/or mortality with RN, RT, and neurology and obstetrics    The patient remains critically ill.  Critical care time = 195 minutes in directly providing and coordinating critical care and extensive data review.  No time overlap and excludes procedures.    A Critical Care Medicine progress note may have been authored by a resident physician or advanced practitioner of nursing under my direct supervision on this date of service.  As the supervising and attending physician, I have either attested to or cosigned that document.  IN THE EVENT THAT DISCREPANCIES EXIST BETWEEN THIS DOCUMENT AND ANY DOCUMENT THAT I HAVE ATTESTED TO OR COSIGNED ON THIS DATE OF SERVICE, THEN THIS DOCUMENT REMAINS THE FINAL AUTHORITY AS TO MY ASSESSMENT AND PLAN REGARDING THE CARE OF THIS PATIENT.    Samuel Taylor MD  Pulmonary and Critical Care Medicine

## 2023-01-28 NOTE — RESPIRATORY CARE
Patient intubated in unit 7.0 @ 22 at the teeth, post xray tube pulled back to 19 at the teeth.

## 2023-01-28 NOTE — PROGRESS NOTES
4 Eyes Skin Assessment Completed by KRISTINA Sorto and KRISTINA Nunez.    Head WDL  Ears WDL  Nose WDL  Mouth WDL  Neck WDL  Breast/Chest WDL  Shoulder Blades WDL  Spine Epidural in place  (R) Arm/Elbow/Hand WDL  (L) Arm/Elbow/Hand WDL  Abdomen WDL  Groin WDL  Scrotum/Coccyx/Buttocks WDL  (R) Leg WDL  (L) Leg WDL  (R) Heel/Foot/Toe WDL  (L) Heel/Foot/Toe WDL          Devices In Places ECG, Blood Pressure Cuff, Pulse Ox, López, Arterial Line, SCD's, EEG, ET Tube, and Central Line      Interventions In Place Pillows, Q2 Turns, and Low Air Loss Mattress    Possible Skin Injury No    Pictures Uploaded Into Epic N/A  Wound Consult Placed N/A  RN Wound Prevention Protocol Ordered Yes

## 2023-01-28 NOTE — PROGRESS NOTES
1900 - Report received from Roxy ACEVEDO. POC discussed, assumed care.     1903 - This RN entered 215 to assess patients bleeding. RN finds infant has fallen to the side of the patient in the bed. Patient and Infant alone in room. Infant taken to warmer with transition nurse. Attempting to communicate with pt via  with no success, patient not responding to RN or . Patient kicking and hitting this RN.     1906 -  called to bedside due to patients unwillingness to allow this RN to assess her and being combative.    1910 - MD at bedside to assess. Pt w/ altered LOC, begins to vomit and seizing. Cord pulled, rapid response initiated. See rapid response charting.    2105- Patient transported via bed to CT then to [facility] 622. Report given to [facility] KRISTINA Sorto.

## 2023-01-28 NOTE — PROCEDURES
Date of service:  1/27/2023    Title:  Central venous catheter placement - internal jugular vein    Indication: Cerebral edema    Narrative:    A time out was performed identifying the correct patient, correct procedure and correct location prior to this procedure.    The right neck was prepped with chlorhexidine and draped in the usual sterile fashion.  1% Xylocaine solution was used for topical anesthesia.  A triple lumen central venous catheter was placed into the right internal jugular vein under ultrasound guidance using the technique described by Jason without difficulty or apparent complication.  The guidewire was removed intact.  The line was sutured into place and a sterile dressing was placed over the line.  All 3 ports flush and return venous blood easily.  The patient tolerated the procedure quite nicely.  No complications are apparent.  A STAT CXR is ordered to confirm placement.  The catheter may be safely used for infusion and medication administration.      Samuel Taylor MD  Pulmonary and Critical Care Medicine

## 2023-01-28 NOTE — PROGRESS NOTES
"Jessicamary Culbertson Yogesh  PPD 1    Subjective: 17-year-old  1 para 1-0-0-1, status post low vacuum-assisted vaginal delivery on 2023.  Postpartum course complicated by tonic-clonic seizure, likely eclamptic in nature.  Currently ICU, intubated and sedated on magnesium sulfate and Keppra.  Continuous EEG in place.      /57   Pulse 93   Temp 36.5 °C (97.7 °F) (Bladder)   Resp (!) 21   Ht 1.5 m (4' 11.06\")   Wt 81.9 kg (180 lb 8.9 oz)   SpO2 96%   Gen: Intubated/sedated  Resp: CTA bilaterally  Abdomen: soft, nontender, nondistended, no rebound or guarding  Fundus: nontender and below umbilicus  Extremities: 2+ edema    Meds:   No current facility-administered medications on file prior to encounter.     Current Outpatient Medications on File Prior to Encounter   Medication Sig Dispense Refill    Prenatal MV-Min-Fe Fum-FA-DHA (PRENATAL 1 PO) Take  by mouth.         Lab:   Recent Results (from the past 48 hour(s))   Hold Blood Bank Specimen (Not Tested)    Collection Time: 23  4:23 AM   Result Value Ref Range    Holding Tube - Bb DONE    CBC with differential    Collection Time: 23  4:23 AM   Result Value Ref Range    WBC 12.8 (H) 4.8 - 10.8 K/uL    RBC 4.69 4.20 - 5.40 M/uL    Hemoglobin 13.3 12.0 - 16.0 g/dL    Hematocrit 41.6 37.0 - 47.0 %    MCV 88.7 81.4 - 97.8 fL    MCH 28.4 27.0 - 33.0 pg    MCHC 32.0 (L) 33.6 - 35.0 g/dL    RDW 45.0 35.9 - 50.0 fL    Platelet Count 281 164 - 446 K/uL    MPV 11.9 9.0 - 12.9 fL    Neutrophils-Polys 59.30 44.00 - 72.00 %    Lymphocytes 29.90 22.00 - 41.00 %    Monocytes 7.50 0.00 - 13.40 %    Eosinophils 2.40 0.00 - 6.90 %    Basophils 0.30 0.00 - 1.80 %    Immature Granulocytes 0.60 0.00 - 0.90 %    Nucleated RBC 0.00 /100 WBC    Neutrophils (Absolute) 7.56 (H) 2.00 - 7.15 K/uL    Lymphs (Absolute) 3.81 1.00 - 4.80 K/uL    Monos (Absolute) 0.95 (H) 0.00 - 0.85 K/uL    Eos (Absolute) 0.31 0.00 - 0.51 K/uL    Baso (Absolute) 0.04 0.00 - 0.12 " K/uL    Immature Granulocytes (abs) 0.08 0.00 - 0.11 K/uL    NRBC (Absolute) 0.00 K/uL   T.PALLIDUM AB MOMO (Syphilis)    Collection Time: 23  4:23 AM   Result Value Ref Range    Syphilis, Treponemal Qual Non-Reactive Non-Reactive   EKG    Collection Time: 23  7:40 PM   Result Value Ref Range    Report       [facility]    Test Date:  2023  Pt Name:    QING BROWNING Department: 161  MRN:        [id number]                      Room:       [facility]  Gender:     Female                       Technician:   :        2005                   Requested By:SONDRA SUE  Order #:    [id number]                    Reading MD:    Measurements  Intervals                                Axis  Rate:       180                          P:          39  AR:         152                          QRS:        99  QRSD:       73                           T:          6  QT:         296  QTc:        513    Interpretive Statements  Sinus tachycardia  Multiple ventricular premature complexes  Prominent P waves, nondiagnostic  Borderline right axis deviation  ST depr, consider ischemia, anterolateral lds  Prolonged QT interval  Baseline wander in lead(s) V6  No previous ECG available for comparison     Comp Metabolic Panel    Collection Time: 23  7:45 PM   Result Value Ref Range    Sodium 137 135 - 145 mmol/L    Potassium 4.1 3.6 - 5.5 mmol/L    Chloride 106 96 - 112 mmol/L    Co2 12 (L) 20 - 33 mmol/L    Anion Gap 19.0 (H) 7.0 - 16.0    Glucose 123 (H) 65 - 99 mg/dL    Bun 13 8 - 22 mg/dL    Creatinine 1.00 0.50 - 1.40 mg/dL    Calcium 8.9 8.5 - 10.5 mg/dL    AST(SGOT) 21 12 - 45 U/L    ALT(SGPT) 11 2 - 50 U/L    Alkaline Phosphatase 235 (H) 30 - 99 U/L    Total Bilirubin 0.2 0.1 - 1.5 mg/dL    Albumin 2.9 (L) 3.2 - 4.9 g/dL    Total Protein 6.1 6.0 - 8.2 g/dL    Globulin 3.2 1.9 - 3.5 g/dL    A-G Ratio 0.9 g/dL   ABG - LAB    Collection Time: 23  7:45 PM   Result Value Ref Range    Ph 7.36  (L) 7.40 - 7.50    Pco2 22.5 (L) 26.0 - 37.0 mmHg    Po2 155.7 (H) 64.0 - 87.0 mmHg    O2 Saturation 97.9 93.0 - 99.0 %    Hco3 12 (L) 17 - 25 mmol/L    Base Excess -11 (L) -4 - 3 mmol/L    Body Temp 39.7 Centigrade    Ph -TC 7.32 (L) 7.40 - 7.50    Pco2 -TC 25.3 (L) 26.0 - 37.0 mmHg    Po2 -.4 (H) 64.0 - 87.0 mmHg   CBC WITH DIFFERENTIAL    Collection Time: 01/27/23  7:45 PM   Result Value Ref Range    WBC 28.3 (H) 4.8 - 10.8 K/uL    RBC 4.28 4.20 - 5.40 M/uL    Hemoglobin 12.2 12.0 - 16.0 g/dL    Hematocrit 38.2 37.0 - 47.0 %    MCV 89.3 81.4 - 97.8 fL    MCH 28.5 27.0 - 33.0 pg    MCHC 31.9 (L) 33.6 - 35.0 g/dL    RDW 46.0 35.9 - 50.0 fL    Platelet Count 303 164 - 446 K/uL    MPV 11.9 9.0 - 12.9 fL    Neutrophils-Polys 77.20 (H) 44.00 - 72.00 %    Lymphocytes 15.80 (L) 22.00 - 41.00 %    Monocytes 5.20 0.00 - 13.40 %    Eosinophils 0.00 0.00 - 6.90 %    Basophils 0.20 0.00 - 1.80 %    Immature Granulocytes 1.60 (H) 0.00 - 0.90 %    Nucleated RBC 0.00 /100 WBC    Neutrophils (Absolute) 21.84 (H) 2.00 - 7.15 K/uL    Lymphs (Absolute) 4.48 1.00 - 4.80 K/uL    Monos (Absolute) 1.46 (H) 0.00 - 0.85 K/uL    Eos (Absolute) 0.01 0.00 - 0.51 K/uL    Baso (Absolute) 0.05 0.00 - 0.12 K/uL    Immature Granulocytes (abs) 0.45 (H) 0.00 - 0.11 K/uL    NRBC (Absolute) 0.00 K/uL   proBrain Natriuretic Peptide, NT    Collection Time: 01/27/23  7:45 PM   Result Value Ref Range    NT-proBNP 271 (H) 0 - 125 pg/mL   Lactic Acid -STAT Once    Collection Time: 01/27/23  7:45 PM   Result Value Ref Range    Lactic Acid 8.7 (HH) 0.5 - 2.0 mmol/L   Prothrombin time (INR)    Collection Time: 01/27/23  7:45 PM   Result Value Ref Range    PT 13.6 12.0 - 14.6 sec    INR 1.05 0.87 - 1.13   Troponin    Collection Time: 01/27/23  7:45 PM   Result Value Ref Range    Troponin T 118 (H) 6 - 19 ng/L   TSH WITH REFLEX TO FT4    Collection Time: 01/27/23  7:45 PM   Result Value Ref Range    TSH 1.750 0.380 - 5.330 uIU/mL   CORRECTED CALCIUM     Collection Time: 01/27/23  7:45 PM   Result Value Ref Range    Correct Calcium 9.8 8.5 - 10.5 mg/dL   ESTIMATED GFR    Collection Time: 01/27/23  7:45 PM   Result Value Ref Range    GFR (CKD-EPI) 83 >60 mL/min/1.73 m 2   Triglyceride    Collection Time: 01/27/23  7:45 PM   Result Value Ref Range    Triglycerides 294 (H) 0 - 149 mg/dL   PROTEIN/CREAT RATIO URINE    Collection Time: 01/27/23  7:50 PM   Result Value Ref Range    Total Protein, Urine 55.0 (H) 0.0 - 15.0 mg/dL    Creatinine, Random Urine 78.23 mg/dL    Protein Creatinine Ratio 703 (H) 10 - 107 mg/g   Urinalysis    Collection Time: 01/27/23  7:50 PM    Specimen: Urine, López Cath   Result Value Ref Range    Color Yellow     Character Clear     Specific Gravity 1.012 <1.035    Ph 7.5 5.0 - 8.0    Glucose Negative Negative mg/dL    Ketones 15 (A) Negative mg/dL    Protein 30 (A) Negative mg/dL    Bilirubin Negative Negative    Urobilinogen, Urine 0.2 Negative    Nitrite Negative Negative    Leukocyte Esterase Trace (A) Negative    Occult Blood Large (A) Negative    Micro Urine Req Microscopic    URINE DRUG SCREEN    Collection Time: 01/27/23  7:50 PM   Result Value Ref Range    Amphetamines Urine Negative Negative    Barbiturates Negative Negative    Benzodiazepines Negative Negative    Cocaine Metabolite Negative Negative    Methadone Negative Negative    Opiates Negative Negative    Oxycodone Negative Negative    Phencyclidine -Pcp Negative Negative    Propoxyphene Negative Negative    Cannabinoid Metab Negative Negative   URINE MICROSCOPIC (W/UA)    Collection Time: 01/27/23  7:50 PM   Result Value Ref Range    WBC 5-10 (A) /hpf    RBC >150 (A) /hpf    Bacteria Negative None /hpf    Epithelial Cells Few /hpf    Hyaline Cast 3-5 (A) /lpf   POCT arterial blood gas device results    Collection Time: 01/27/23 10:42 PM   Result Value Ref Range    Ph 7.344 (L) 7.400 - 7.500    Pco2 31.5 26.0 - 37.0 mmHg    Po2 179 (H) 64 - 87 mmHg    Tco2 18 (L) 20 - 33 mmol/L     S02 100 (H) 93 - 99 %    Hco3 17.2 17.0 - 25.0 mmol/L    BE -7 (L) -4 - 3 mmol/L    Body Temp 38.4 C degrees    O2 Therapy 50 %    iPF Ratio 358     Ph Temp Kelli 7.324 (L) 7.400 - 7.500    Pco2 Temp Co 33.5 26.0 - 37.0 mmHg    Po2 Temp Cor 187 (H) 64 - 87 mmHg    Specimen Arterial    POCT lactate device results    Collection Time: 01/27/23 10:42 PM   Result Value Ref Range    iStat Lactate 1.7 0.5 - 2.0 mmol/L   LACTIC ACID    Collection Time: 01/28/23  1:40 AM   Result Value Ref Range    Lactic Acid 1.4 0.5 - 2.0 mmol/L   CBC with Differential    Collection Time: 01/28/23  1:40 AM   Result Value Ref Range    WBC 24.2 (H) 4.8 - 10.8 K/uL    RBC 3.69 (L) 4.20 - 5.40 M/uL    Hemoglobin 10.5 (L) 12.0 - 16.0 g/dL    Hematocrit 32.5 (L) 37.0 - 47.0 %    MCV 88.1 81.4 - 97.8 fL    MCH 28.5 27.0 - 33.0 pg    MCHC 32.3 (L) 33.6 - 35.0 g/dL    RDW 46.8 35.9 - 50.0 fL    Platelet Count 195 164 - 446 K/uL    MPV 11.4 9.0 - 12.9 fL    Neutrophils-Polys 86.70 (H) 44.00 - 72.00 %    Lymphocytes 4.90 (L) 22.00 - 41.00 %    Monocytes 6.20 0.00 - 13.40 %    Eosinophils 0.00 0.00 - 6.90 %    Basophils 0.40 0.00 - 1.80 %    Immature Granulocytes 1.80 (H) 0.00 - 0.90 %    Nucleated RBC 0.10 /100 WBC    Neutrophils (Absolute) 20.93 (H) 2.00 - 7.15 K/uL    Lymphs (Absolute) 1.19 1.00 - 4.80 K/uL    Monos (Absolute) 1.50 (H) 0.00 - 0.85 K/uL    Eos (Absolute) 0.01 0.00 - 0.51 K/uL    Baso (Absolute) 0.10 0.00 - 0.12 K/uL    Immature Granulocytes (abs) 0.44 (H) 0.00 - 0.11 K/uL    NRBC (Absolute) 0.02 K/uL   Magnesium    Collection Time: 01/28/23  1:40 AM   Result Value Ref Range    Magnesium 5.6 (H) 1.5 - 2.5 mg/dL   Phosphorus    Collection Time: 01/28/23  1:40 AM   Result Value Ref Range    Phosphorus 3.2 2.5 - 4.5 mg/dL   Comp Metabolic Panel    Collection Time: 01/28/23  1:40 AM   Result Value Ref Range    Sodium 136 135 - 145 mmol/L    Potassium 3.4 (L) 3.6 - 5.5 mmol/L    Chloride 110 96 - 112 mmol/L    Co2 14 (L) 20 - 33 mmol/L     Anion Gap 12.0 7.0 - 16.0    Glucose 109 (H) 65 - 99 mg/dL    Bun 14 8 - 22 mg/dL    Creatinine 0.64 0.50 - 1.40 mg/dL    Calcium 6.8 (LL) 8.5 - 10.5 mg/dL    AST(SGOT) 54 (H) 12 - 45 U/L    ALT(SGPT) 20 2 - 50 U/L    Alkaline Phosphatase 189 (H) 30 - 99 U/L    Total Bilirubin 0.3 0.1 - 1.5 mg/dL    Albumin 2.3 (L) 3.2 - 4.9 g/dL    Total Protein 5.1 (L) 6.0 - 8.2 g/dL    Globulin 2.8 1.9 - 3.5 g/dL    A-G Ratio 0.8 g/dL   CORRECTED CALCIUM    Collection Time: 01/28/23  1:40 AM   Result Value Ref Range    Correct Calcium 8.2 (L) 8.5 - 10.5 mg/dL   ESTIMATED GFR    Collection Time: 01/28/23  1:40 AM   Result Value Ref Range    GFR (CKD-EPI) 130 >60 mL/min/1.73 m 2   Blood Culture,Hold    Collection Time: 01/28/23  1:40 AM   Result Value Ref Range    Blood Culture Hold Collected    Blood Culture,Hold    Collection Time: 01/28/23  1:40 AM   Result Value Ref Range    Blood Culture Hold Collected    POCT arterial blood gas device results    Collection Time: 01/28/23  4:11 AM   Result Value Ref Range    Ph 7.366 (L) 7.400 - 7.500    Pco2 27.4 26.0 - 37.0 mmHg    Po2 153 (H) 64 - 87 mmHg    Tco2 16 (L) 20 - 33 mmol/L    S02 99 93 - 99 %    Hco3 15.7 (L) 17.0 - 25.0 mmol/L    BE -8 (L) -4 - 3 mmol/L    Body Temp 38.3 C degrees    O2 Therapy 40 %    iPF Ratio 383     Ph Temp Kelli 7.347 (L) 7.400 - 7.500    Pco2 Temp Co 29.0 26.0 - 37.0 mmHg    Po2 Temp Cor 161 (H) 64 - 87 mmHg    Specimen Arterial     DelSys Vent     End Tidal Carbon Dioxide 28 mmhg    Tidal Volume 400 mL    Peep End Expiratory Pressure 8 cmh20    Set Rate 20     Mode APV-CMV    LACTIC ACID    Collection Time: 01/28/23  6:00 AM   Result Value Ref Range    Lactic Acid 1.1 0.5 - 2.0 mmol/L       Assessment and Plan  PPD#1 s/p lap assisted vaginal delivery at term    Currently intubated in the ICU.  Plan for MRI later today.  ICU also plan on decrease sedation possible extubation.    Continue magnesium sulfate for at least 24 hours, blood pressure control.

## 2023-01-28 NOTE — PROGRESS NOTES
Brief neurology note    Information obtained from the ICU team.  Patient came in earlier today with labor pains.  Had successful delivery approximately 6:30 PM today.  No complications.  Afterwards patient became agitated and had seizures.  Resulted in the patient being intubated.  Patient was taken to CT scan.  Read as showing diffuse cerebral edema.  With compressed ventricles on both sides.  Patient is now intubated on propofol.  No obvious signs of seizures.  Pupils are small midline minimally reactive.  Team is preparing for central line and art line placement now.  Concern for eclampsia.  No documentation of hypertension before today.  Agree with ICU team to obtain a CTA/CTV to rule out venous thrombosis.  We will get a continuous EEG after the scans are done to monitor for seizure activity.  Start the patient on Keppra 1500 mg twice daily and continue the propofol for now.

## 2023-01-28 NOTE — PROGRESS NOTES
Pt RIJ central line removed. A line removeed. Manual pressure held. Hemostasis. Sterile dressing applied. +2 distal pulse on R radial. No evidence of bleeding or hematoma on both insertion sites.

## 2023-01-28 NOTE — ANESTHESIA POSTPROCEDURE EVALUATION
Patient: Pedro Youssefncio    Procedure Summary     Date: 01/27/23 Room / Location:     Anesthesia Start: 0523 Anesthesia Stop: 1803    Procedure: Labor Epidural Diagnosis:     Scheduled Providers:  Responsible Provider: Bryan Ivey M.D.    Anesthesia Type: epidural ASA Status: 2          Final Anesthesia Type: epidural  Last vitals  BP   Blood Pressure: 98/55, Arterial BP: 100/61    Temp   37.6 °C (99.7 °F)    Pulse   (!) 111   Resp   (!) 23    SpO2   97 %      Anesthesia Post Evaluation    Patient location during evaluation: bedside    Respiratory status: ETT  Comments: Patient experienced seizure like activity about 1 hour after delivery. Concern for eclampsia.   She was started on a magnesium infusion and received Ativan x2 doses. Patient was intubated for airway protection.  She was transferred to the ICU after CT imaging was obtained.       patient was unable to participate        No notable events documented.

## 2023-01-28 NOTE — LACTATION NOTE
This note was copied from a baby's chart.  Basics of hospital grade breast pump use introduced today with mother. Written information to help support frequency and duration provided. Plan is to follow this mother while she remains hospitalized to ensure the establishment and subsequent maintenance of adequate milk supply, and help direct her to appropriate resources.     Education was provided via interpretor.

## 2023-01-28 NOTE — CARE PLAN
Problem: Ventilation  Goal: Ability to achieve and maintain unassisted ventilation or tolerate decreased levels of ventilator support  Description: Target End Date:  4 days      Document on Vent flowsheet     1.  Support and monitor invasive and noninvasive mechanical ventilation  2.  Monitor ventilator weaning response  3.  Perform ventilator associated pneumonia prevention interventions  4.  Manage ventilation therapy by monitoring diagnostic test results  Outcome: Not Met                               Ventilator Daily Summary     Vent Day #2     Ventilator settings changed this shift: APV 20 400 +8 40%     Weaning trials: N/A     Respiratory Procedures: N/A     Plan: Continue current ventilator settings and wean mechanical ventilation as tolerated per physician orders   pacu-home

## 2023-01-28 NOTE — CARE PLAN
The patient is Watcher - Medium risk of patient condition declining or worsening    Shift Goals  Clinical Goals: reduce blood pressure, reduce temperature  Patient Goals: ANISHA  Family Goals: ANISHA    Progress made toward(s) clinical / shift goals:    Problem: Mechanical Ventilation  Goal: Safe management of artificial airway and ventilation  Outcome: Progressing  Note: HOB greater than 30  Goal: Successful weaning off mechanical ventilator, spontaneously maintains adequate gas exchange  Outcome: Progressing  Goal: Patient will be able to express needs and understand communication  Outcome: Progressing     Problem: Pain - Standard  Goal: Alleviation of pain or a reduction in pain to the patient’s comfort goal  1/28/2023 0641 by Noreen Justin R.N.  Outcome: Progressing  Note: Pain controlled with IV analgesics  1/28/2023 0633 by Noreen Justin RKeelyN.  Outcome: Progressing  Note: Pain controled with IV analgesics     Problem: Safety - Medical Restraint  Goal: Remains free of injury from restraints (Restraint for Interference with Medical Device)  Outcome: Progressing  Flowsheets (Taken 1/28/2023 0641)  Addressed this shift: Remains free of injury from restraints (restraint for interference with medical device):   Determine that other, less restrictive measures have been tried or would not be effective before applying the restraint   Evaluate the patient's condition at the time of restraint application   Inform patient/family regarding the reason for restraint   Every 2 hours: Monitor safety, psychosocial status, comfort, nutrition and hydration  Note: Pt is free from injury  Goal: Free from restraint(s) (Restraint for Interference with Medical Device)  Outcome: Progressing  Flowsheets (Taken 1/28/2023 0641)  Addressed this shift: Free from restraint(s) (restraint for interference with medical device):   ONCE/SHIFT or MINIMUM Every 12 hours: Assess and document the continuing need for restraints   Every 24 hours:  Continued use of restraint requires Licensed Independent Practitioner to perform face to face examination and written order   Identify and implement measures to help patient regain control  Note: Pt still requires mechanical restraints to prevent from pulling at tubes and lines       Patient is not progressing towards the following goals:

## 2023-01-28 NOTE — CARE PLAN
Problem: Knowledge Deficit - L&D  Goal: Patient and family/caregivers will demonstrate understanding of plan of care, disease process/condition, diagnostic tests and medications  Outcome: Progressing     Problem: Risk for Excess Fluid Volume  Goal: Patient will demonstrate pulse, blood pressure and neurologic signs within expected ranges and without any respiratory complications  Outcome: Progressing     Problem: Hemodynamics  Goal: Patient's hemodynamics, fluid balance and neurologic status will be stable or improve  Outcome: Progressing     Problem: Fluid Volume  Goal: Fluid volume balance will be maintained  Outcome: Progressing     Problem: Urinary - Renal Perfusion  Goal: Ability to achieve and maintain adequate renal perfusion and functioning will improve  Outcome: Progressing     Problem: Respiratory  Goal: Patient will achieve/maintain optimum respiratory ventilation and gas exchange  Outcome: Progressing     Problem: Mechanical Ventilation  Goal: Safe management of artificial airway and ventilation  Outcome: Progressing     Problem: Safety - Medical Restraint  Goal: Remains free of injury from restraints (Restraint for Interference with Medical Device)  Outcome: Progressing  Goal: Free from restraint(s) (Restraint for Interference with Medical Device)  Outcome: Progressing   The patient is Watcher - Medium risk of patient condition declining or worsening    Shift Goals  Clinical Goals: hemodynamiv stability, extubate  Patient Goals: na  Family Goals: na    Progress made toward(s) clinical / shift goals:  pt SBT, bp stable, goal to extubate    Patient is not progressing towards the following goals:

## 2023-01-28 NOTE — PROGRESS NOTES
Patient with no seizure of concerns seen on cEEG. Will wean sedation and continue to monitor cEEG closely for any concerns or changes.     Will start fentanyl for pain and comfort.     CXR confirmed at below clavicle.   Updated family of plan of care earlier in evening.  Lactate cleared 1.4 post seizure  Hyperchloremic acidosis seen on chemistry bicarb of 14  K low will replace  Mag level 5.6 on mag infusion for eclampsia per protocol.   Leukocytosis and aspiration on Unasyn.    Patient remains in critical condition from acute hyperpyrexia, seizure, respiratory failure on ventilator and fluid administration . Critical care time provided was 44 minutes. This excludes all separate billable procedures.     Jc Mora MD  Critical Care Medicine

## 2023-01-28 NOTE — PROGRESS NOTES
2101: Pt arrived to T622 with respiratory, L&D RN, and rapid nurses. Pt is intubated. Pt tachycardic. Other vitals stable.

## 2023-01-28 NOTE — CONSULTS
"Neurology Initial Consult H&P  Neurohospitalist Service, [facility]    Referring Physician: Kulwant Herron M.D.    Chief Complaint   Patient presents with    Contractions     Per patient she has been \"harpreet every 4 minutes during the night and it is getting stronger\"       HPI: Pedro Zuñiga is a 17 y.o. woman presenting for whom neurology has been consulted for post-partum seizure.    17-year-old woman  postpartum uncomplicated pregnancy and uncomplicated spontaneous vaginal delivery who experienced agitation, encephalopathy, febrile, hypertensive and seizure activity described as a witnessed generalized tonic-clonic seizure.  Treated with 4 mg of lorazepam and magnesium.  Noncontrast CT of the head revealed diffuse cerebral edema.  Managed in the ICU; intubated and on propofol.  No recognized seizure recurrence.  Concern was for eclampsia.  Continuous EEG and Keppra were initiated.  CTA/CTV were completed.    Nursing reports that the patient is spontaneous breathing over the vent and the plan is for extubation today.  Moving all extremities and following commands.  No sedation.  No witnessed seizures.  No acute events.    Review of systems: Unable to assess    Past Medical History:    has no past medical history on file.    FHx:  family history includes No Known Problems in her brother, father, maternal grandfather, maternal grandmother, mother, paternal grandfather, paternal grandmother, sister, sister, and sister.    SHx:   reports that she has never smoked. She has never used smokeless tobacco. She reports that she does not currently use alcohol. She reports that she does not use drugs.    Allergies:  No Known Allergies    Medications:    Current Facility-Administered Medications:     fentaNYL (SUBLIMAZE) injection 100 mcg, 100 mcg, Intravenous, Q15 MIN PRN, 100 mcg at 23 0432 **AND** fentaNYL (SUBLIMAZE) injection 200 mcg, 200 mcg, Intravenous, Q15 MIN " PRN, 200 mcg at 01/28/23 0526 **AND** fentaNYL (SUBLIMAZE) 50 mcg/mL in 50mL (Continuous Infusion), , Intravenous, Continuous, Dose not Required at 01/28/23 0400 **AND** propofol (DIPRIVAN) injection, 0-80 mcg/kg/min (Order-Specific), Intravenous, Continuous, Stopped at 01/28/23 0537 **AND** [START ON 1/29/2023] Triglyceride, , , Every 3 Days (0300), Jc Mora M.D.    levETIRAcetam (Keppra) injection 1,500 mg, 1,500 mg, Intravenous, Q12HRS, Pawan Mendez M.D., 1,500 mg at 01/28/23 0652    acetaminophen (TYLENOL) suppository 975 mg, 975 mg, Rectal, Q4HRS PRN, Will Meyer M.D., 650 mg at 01/27/23 1943    Respiratory Therapy Consult, , Nebulization, Continuous RT, Samuel Taylor M.D.    famotidine (PEPCID) tablet 20 mg, 20 mg, Enteral Tube, Q12HRS **OR** famotidine (PEPCID) injection 20 mg, 20 mg, Intravenous, Q12HRS, Samuel Taylor M.D., 20 mg at 01/28/23 0511    senna-docusate (PERICOLACE or SENOKOT S) 8.6-50 MG per tablet 2 Tablet, 2 Tablet, Enteral Tube, BID **AND** polyethylene glycol/lytes (MIRALAX) PACKET 1 Packet, 1 Packet, Enteral Tube, QDAY PRN **AND** magnesium hydroxide (MILK OF MAGNESIA) suspension 30 mL, 30 mL, Enteral Tube, QDAY PRN **AND** bisacodyl (DULCOLAX) suppository 10 mg, 10 mg, Rectal, QDAY PRN, Samuel Taylor M.D.    MD Alert...ICU Electrolyte Replacement per Pharmacy, , Other, PHARMACY TO DOSE, Samuel Taylor M.D.    lidocaine (XYLOCAINE) 1 % injection 2 mL, 2 mL, Tracheal Tube, Q30 MIN PRN, Samuel Taylor M.D.    ipratropium-albuterol (DUONEB) nebulizer solution, 3 mL, Nebulization, Q2HRS PRN (RT), Samuel Taylor M.D.    HYDROmorphone (Dilaudid) injection 0.5-2 mg, 0.5-2 mg, Intravenous, Q HOUR PRN, Samuel Taylor M.D.    calcium GLUConate injection 1 g, 1 g, Intravenous, Once PRN, Will Meyer M.D.    magnesium sulfate 40 g/1000mL infusion, 2 g/hr, Intravenous, Continuous, Will Meyer M.D., Last Rate:  "50 mL/hr at 01/28/23 0704, 2 g/hr at 01/28/23 0704    NS infusion, , Intravenous, Continuous, Samuel Taylor M.D., Last Rate: 125 mL/hr at 01/28/23 0704, Rate Verify at 01/28/23 0704    niCARdipine (CARDENE) 25 mg in  mL Standard Infusion, 0-15 mg/hr, Intravenous, Continuous, Samuel Taylor M.D., Dose not Required at 01/27/23 2315    LORazepam (ATIVAN) injection 2 mg, 2 mg, Intravenous, Q2HRS PRN, Samuel Taylor M.D.    ampicillin/sulbactam (UNASYN) 3 g in  mL IVPB, 3 g, Intravenous, Q6HRS, Samuel Taylor M.D., Stopped at 01/28/23 0548    Physical Examination:     General: Intubated. Appears to be sleeping. Not sedated.     NEUROLOGICAL EXAM:     /58   Pulse (!) 102   Temp 36.5 °C (97.7 °F) (Bladder)   Resp (!) 30   Ht 1.5 m (4' 11.06\")   Wt 81.9 kg (180 lb 8.9 oz)   LMP 04/26/2022 (Exact Date)   SpO2 98%   Breastfeeding Unknown   BMI 36.40 kg/m²       Intubated with spontaneous breathing over the vent.  No sedation.  Patient was asleep and easily awakens to voice.  Follows motor commands in all 4 extremities.  Is able to briefly visually track the examiner.  Pupils equal round reactive to light.  No gaze preference or disconjugate gaze.  No overt nystagmus.  Face appears symmetric.  Hearing is intact to conversation.  Moves all extremities symmetrically.  Tone is normal.  Bulk is normal.  DTRs 2+ in the bilateral upper and lower extremities.  Flexor toes bilaterally.  There were no abnormal movements or postures observed.    Objective Data:    Labs:  Lab Results   Component Value Date/Time    PROTHROMBTM 13.6 01/27/2023 07:45 PM    INR 1.05 01/27/2023 07:45 PM      Lab Results   Component Value Date/Time    WBC 24.2 (H) 01/28/2023 01:40 AM    RBC 3.69 (L) 01/28/2023 01:40 AM    HEMOGLOBIN 10.5 (L) 01/28/2023 01:40 AM    HEMATOCRIT 32.5 (L) 01/28/2023 01:40 AM    MCV 88.1 01/28/2023 01:40 AM    MCH 28.5 01/28/2023 01:40 AM    MCHC 32.3 (L) 01/28/2023 01:40 " AM    MPV 11.4 01/28/2023 01:40 AM    NEUTSPOLYS 86.70 (H) 01/28/2023 01:40 AM    LYMPHOCYTES 4.90 (L) 01/28/2023 01:40 AM    MONOCYTES 6.20 01/28/2023 01:40 AM    EOSINOPHILS 0.00 01/28/2023 01:40 AM    BASOPHILS 0.40 01/28/2023 01:40 AM      Lab Results   Component Value Date/Time    SODIUM 136 01/28/2023 01:40 AM    POTASSIUM 3.4 (L) 01/28/2023 01:40 AM    CHLORIDE 110 01/28/2023 01:40 AM    CO2 14 (L) 01/28/2023 01:40 AM    GLUCOSE 109 (H) 01/28/2023 01:40 AM    BUN 14 01/28/2023 01:40 AM    CREATININE 0.64 01/28/2023 01:40 AM      Lab Results   Component Value Date/Time    TRIGLYCERIDE 294 (H) 01/27/2023 07:45 PM       Lab Results   Component Value Date/Time    ALKPHOSPHAT 189 (H) 01/28/2023 01:40 AM    ASTSGOT 54 (H) 01/28/2023 01:40 AM    ALTSGPT 20 01/28/2023 01:40 AM    TBILIRUBIN 0.3 01/28/2023 01:40 AM        Imaging/Testing:    I interpreted and/or reviewed the patient's neuroimaging    DX-CHEST-PORTABLE (1 VIEW)   Final Result         1.  No acute cardiopulmonary disease.      CT-CTA HEAD WITH & W/O-POST PROCESS   Final Result         1.  No large vessel occlusion or aneurysm identified   2.  Diffuse sulcal effacement with slitlike ventricles, appearance favoring cerebral edema, appears similar to prior study.      CT-CTV HEAD WITH & W/O POST PROCESS   Final Result         1.  There is no evidence of dural venous sinus thrombosis.      DX-CHEST-PORTABLE (1 VIEW)   Final Result         1.  No acute cardiopulmonary disease.      CT-CTA CHEST PULMONARY ARTERY W/ RECONS   Final Result         1.  No large central pulmonary embolus is appreciated, evaluation of the distal segmental and subsegmental branches is essentially nondiagnostic due to motion artifacts. Additional imaging would be required for definitive exclusion of small distal    pulmonary emboli.   2.  Hazy linear densities in bilateral lung bases suggests atelectasis, component of infiltrate not excluded.   3.  Hepatomegaly and diffuse hepatic  steatosis      CT-HEAD W/O   Final Result         1.  Diffuse sulcal effacement and slitlike bilateral ventricles. Appearance concerning for diffuse cerebral edema, which in the peripartum period would be concerning for eclampsia.      These findings were discussed with the patient's clinician, Dr. Taylor, on 1/27/2023 9:13 PM.         DX-CHEST-PORTABLE (1 VIEW)   Final Result         1.  No acute cardiopulmonary disease.   2.  Right mainstem intubation, recommend withdrawal 3 to 4 cm.      These findings were discussed with the patient's clinician, Will Meyer, on 1/27/2023 8:42 PM.      MR-BRAIN-WITH & W/O    (Results Pending)       Assessment and Plan:  Post-partum seizure and cerebral edema; presumed eclamptic or RCVS or RPLS mechanism of cerebral vascular dysregulation. Unclear trigger or precipitant.  Reassuringly no recognized clinical seizure recurrence.  Continuous EEG report is pending.  Neurologic examination the patient's respond to voice and follows basic motor commands.  There are no clear lateralizing or localizing features.  CTA of the head without large vessel occlusion or aneurysm.  CTV of the head without venous thrombosis.  - I agree with MRI brain with and without contrast when able.  - Extubation as per the primary team.  - cEEG.  Would continue until patient is successfully extubated and without seizure recurrence this AM.  Then ok discontinue.  - Seizure precautions.  - Ativan IV as needed for clinical seizures.  - Continue Keppra 1500 mg twice daily for now as seizure risk of recurrence is unclear.  - Neurology will follow along.        James Bradley MD  Neurohospitalist, Acute Care Services

## 2023-01-28 NOTE — ASSESSMENT & PLAN NOTE
Tachycardia, hypertension, encephalopathy and then seizure. Responded to Magnesium and lorazepam - seizure aborted.   -Continued magnesium infusion, appreciate OB GYN recs  -Seizure precautions - see seizure for details about management  -BP control see hypertension management, SBP goal less than 160

## 2023-01-28 NOTE — PROGRESS NOTES
Lab called with critical result of 6.8 for calcium at 0243. Critical lab result read back.   Dr. Garnica notified of critical lab result at 0245.  Critical lab result read back by Dr. Garnica.

## 2023-01-28 NOTE — ASSESSMENT & PLAN NOTE
Likely secondary to Eclampsia, but PRES not ruled out, some edema on imaging. Neurology consulted  -No clinical or EEG seizure after initial event  -Neuro recommends continue Keppra for now  -Follow up MRI head  -Magnesium per OB GYN recs

## 2023-01-28 NOTE — RESPIRATORY CARE
Extubation    Cuff leak noted yes  Stridor present no     O2 (LPM): 4 (01/28/23 1312)     Patient toleration well  RCP Complete? no  Events/Summary/Plan: Extubated patient per MD orders (01/28/23 1312)

## 2023-01-28 NOTE — ANESTHESIA PROCEDURE NOTES
Airway    Date/Time: 1/27/2023 7:59 PM  Performed by: Bryan Ivey M.D.  Authorized by: Bryan Ivey M.D.     Location:  OB  Urgency:  Emergent  Consent Cannot be Obtained Due to Urgency: Yes    Indications for Airway Management:  Airway protection and respiratory distress      Spontaneous Ventilation: absent    Sedation Level:  Deep  Preoxygenated: Yes    Patient Position:  Sniffing  Final Airway Type:  Endotracheal airway  Final Endotracheal Airway:  ETT  Cuffed: Yes    Technique Used for Successful ETT Placement:  Video laryngoscopy  Devices/Methods Used in Placement:  Cricoid pressure    Insertion Site:  Oral  Blade Type:  Glide  Laryngoscope Blade/Videolaryngoscope Blade Size:  3  ETT Size (mm):  7.0  Measured from:  Teeth  ETT to Teeth (cm):  20  Placement Verified by: auscultation and capnometry    Cormack-Lehane Classification:  Grade I - full view of glottis  Number of Attempts at Approach:  1   Propofol 100mg IV, Rocuronium 100mg, DL x1 with glidescope, VC visualized, ETT advanced through VC, +ETCO2, secured by RT

## 2023-01-28 NOTE — PROCEDURES
Date of service:  1/27/2023    Title:  Arterial catheter placement - axillary artery    Indication: Cerebral edema.  Hypertension.    Narrative:    A time out was performed identifying the correct patient, correct procedure and correct location for this procedure prior to performing this procedure.    The right arm was prepped with chlorhexidine and draped in the usual sterile fashion.  A 20 gauge arterial catheter was placed into the right axillary artery under ultrasound guidance using the technique described by Jason without difficulty or apparent complication.  The guidewire was removed intact.  The line was sutured into place and a sterile dressing was placed over the line.  An outstanding arterial waveform is present on the monitor.  The patient tolerated the procedure quite nicely.  No complications are apparent.      Samuel Taylor MD  Pulmonary and Critical Care Medicine

## 2023-01-28 NOTE — L&D DELIVERY NOTE
Vaginal Delivery Procedure Note:    Pedro Zuñiga is a 17 y.o. , admitted for contractions, prodromal labor and pain management.  Her labor course unremarkable.    PreDelivery Diagnosis:  1. SIUP @ 39w3d    PostDelivery Diagnosis:  1. S/p       Procedure in Detail:  Pt pushing dorsal lithotomy position.  Head delivered easily and restituted towards maternal left.  Anterior shoulder followed easily with gentle downwards pressure followed by the posterior shoulder and body.  female infant delivered @ 1803.  There was no nuchal cord.  Infant was placed on the maternal abdomen and was stimulated and bulb suctioned of meconium.  Cord clamping was delayed x60 seconds then the cord was clamped x2 and cut.  Infant APGARs 6 and 8 and 1 and 5 minutes, respectively.  Birth weight pending.  Cord gases were sent.  IV pitocin bolus started. Cytotec sublingual was given. Placenta delivered spontaneously intact at less than 10 minutes later. 3 Vessel cord.  The vagina and perineum were examined revealing a 1st degree laceration, for which local lidocaine was administered and repaired with stitches.  The uterus was firm with IV pitocin and fundal massage.  Pt and infant left bonding in LDR.      Anesthesia - Local, Epidural  Sponge and needle counts correct.  Patient tolerated procedure well.    Anticipate routine postparum care.        Alma Rosa Cherry M.D.

## 2023-01-28 NOTE — PROGRESS NOTES
Called by nurse secondary to patient being combative and not allowing fundal rubs.    When I arrived in the room, I attempted to discuss the need for fundal rubs with the patient.  Patient would not respond but would still track and look at me when spoken to.  Shortly after arrival, patient began to display abnormal neurological symptoms by staring around the room.  She then began to convulse in a tonic-clonic manner and vomit.  Patient was and quickly turned on her side and the staff assist button was pressed.    Concerns for eclamptic seizure at that time.  Patient was placed on a pulse ox and IV access was obtained.  6 g magnesium sulfate bolus was given at that time.  Patient continued to be combative.  Temperature 41.8 °C.  Cooling blanket was then placed.  Rectal Tylenol was then ordered.  Rapid code button pressed.    The patient continue be combative, 2 mg of Ativan was then given.  This did help with the combativeness and were able to place a second IV at that time.  IV fluids were continued, EKG ordered.  Laboratory data ordered.    Second dose of Ativan given secondary to combativeness.  At the time discussion was held with anesthesia for possible sedation and intubation    Patient tachycardic 180 to 200 bpm.  Labetalol 10 mg IV given.    ICU consult at this time.  Plan to transfer to the ICU.  Temperature decreasing but still elevated at approximate 38-1/2 °C.    Plan for neuroimaging as well as CT angio chest to rule out pulmonary embolism

## 2023-01-28 NOTE — PROCEDURES
CONTINUOUS VIDEO ELECTROENCEPHALOGRAM REPORT    REFERRING PROVIDER: Dr. Taylor  DOS: 1/28/2023 - START TIME: 1:25 AM   ROOM: 22/00   TOTAL RECORDING TIME: 14 hours and 54 minutes of total recording time    INDICATION:  Anacelia Markham Yogesh 17 y.o. female presenting with seizure(s)    CURRENT ANTI-SEIZURE MEDICATIONS AND OTHER RELEVANT TREATMENTS:   Keppra 1500 mg BID  fentaNYL, Last Rate: Stopped (01/28/23 0400)   And  propofol, Last Rate: Stopped (01/28/23 0537)  magnesium sulfate, Last Rate: 2 g/hr (01/28/23 0704)  NS, Last Rate: Stopped (01/28/23 1000)  niCARdipine 25 mg/250 mL (0.1 mg/mL) standard infusion, Last Rate: Stopped (01/27/23 2315)        TECHNIQUE:   CVEEG was set up by a Neurodiagnostic technologist who performed education to the patient and staff. A minimum of 23 electrodes and 23 channel recording was setup and performed by Neurodiagnostic technologist, in accordance with the international 10-20 system. Impedence, electrode integrity, and technical impressions were documented a minimum of every 2-24 hour period by a Neurodiagnostic Technologist and reviewed by Interpreting Physician. The study was reviewed in bipolar and referential montages. The recording examined the patient in the awake, drowsy, and sleep state(s).     DESCRIPTION OF THE RECORD:  EEG background: During maximal wakefulness, the background was continuous, symmetrical, and showed a 9 Hz posterior dominant rhythm.  Reactivity and state changes were present.  During drowsiness, a loss of myogenic artifact and theta/delta frequencies were seen.     Occasional N2 sleep transients in the form of rudimentary and/or ill-defined sleep spindles fragments and vertex waves were seen in the leads over the central regions.     ICTAL AND INTERICTAL FINDINGS:   No focal or generalized epileptiform activity noted.     No persistent regional slowing was seen during this routine study.      No electroclinical or electrographic  seizures were reported or recorded during the study.     ACTIVATION PROCEDURES:   NA    EKG: Sampling of the EKG recording did not demonstrate any abnormalities    EVENTS:  No clinical events recorded or reported    INTERPRETATION:  Initially Abnormal video EEG recording improving to normal video EEG in the awake, drowsy, and sleep state(s):  - Initially the EEG showed moderate background slowing likely reflective encephlopathy and sedation effects. When sedation was stopped background became normal.  - Epileptiform discharges: No definitive epileptiform discharges or other epileptiform phenomena seen.   - No seizures. Clinical correlation is recommended.          Guerrero Whittington MD  Department of Neurology at [facility]  General Neurologist and Epileptologist  Director of [facility]'s Level III Comprehensive Epilepsy Program  Professor of Clinical Neurology, [facility].   Phone: [phone]  Fax: [phone]  E-mail: [email or web address]

## 2023-01-28 NOTE — PROGRESS NOTES
Arterial line insertion:    Dr. Taylor  Primary RN Noreen Gonzalez    Time out at . Name, MRN, , and allergies verified. All in agreement.    Guidewire out and intact at .

## 2023-01-28 NOTE — PROGRESS NOTES
Lab called with critical result of CPK 7970 at 1541. Critical lab result read back to Lab.   Dr. Herron notified of critical lab result at 1541.  Critical lab result read back by Dr. Herron.

## 2023-01-29 LAB
ALBUMIN SERPL BCP-MCNC: 2.8 G/DL (ref 3.2–4.9)
ALBUMIN/GLOB SERPL: 1 G/DL
ALP SERPL-CCNC: 186 U/L (ref 30–99)
ALT SERPL-CCNC: 228 U/L (ref 2–50)
ANION GAP SERPL CALC-SCNC: 11 MMOL/L (ref 7–16)
AST SERPL-CCNC: 293 U/L (ref 12–45)
BACTERIA UR CULT: NORMAL
BASOPHILS # BLD AUTO: 0.2 % (ref 0–1.8)
BASOPHILS # BLD: 0.05 K/UL (ref 0–0.12)
BILIRUB SERPL-MCNC: 0.2 MG/DL (ref 0.1–1.5)
BUN SERPL-MCNC: 11 MG/DL (ref 8–22)
CALCIUM ALBUM COR SERPL-MCNC: 9.2 MG/DL (ref 8.5–10.5)
CALCIUM SERPL-MCNC: 8.2 MG/DL (ref 8.5–10.5)
CHLORIDE SERPL-SCNC: 107 MMOL/L (ref 96–112)
CK SERPL-CCNC: 9884 U/L (ref 0–154)
CO2 SERPL-SCNC: 20 MMOL/L (ref 20–33)
CREAT SERPL-MCNC: 0.55 MG/DL (ref 0.5–1.4)
EOSINOPHIL # BLD AUTO: 0.01 K/UL (ref 0–0.51)
EOSINOPHIL NFR BLD: 0 % (ref 0–6.9)
ERYTHROCYTE [DISTWIDTH] IN BLOOD BY AUTOMATED COUNT: 48.8 FL (ref 35.9–50)
GFR SERPLBLD CREATININE-BSD FMLA CKD-EPI: 135 ML/MIN/1.73 M 2
GLOBULIN SER CALC-MCNC: 2.8 G/DL (ref 1.9–3.5)
GLUCOSE SERPL-MCNC: 106 MG/DL (ref 65–99)
HCT VFR BLD AUTO: 30.7 % (ref 37–47)
HGB BLD-MCNC: 10 G/DL (ref 12–16)
IMM GRANULOCYTES # BLD AUTO: 0.21 K/UL (ref 0–0.11)
IMM GRANULOCYTES NFR BLD AUTO: 1 % (ref 0–0.9)
LYMPHOCYTES # BLD AUTO: 3.64 K/UL (ref 1–4.8)
LYMPHOCYTES NFR BLD: 17.6 % (ref 22–41)
MCH RBC QN AUTO: 28.8 PG (ref 27–33)
MCHC RBC AUTO-ENTMCNC: 32.6 G/DL (ref 33.6–35)
MCV RBC AUTO: 88.5 FL (ref 81.4–97.8)
MONOCYTES # BLD AUTO: 1.05 K/UL (ref 0–0.85)
MONOCYTES NFR BLD AUTO: 5.1 % (ref 0–13.4)
NEUTROPHILS # BLD AUTO: 15.75 K/UL (ref 2–7.15)
NEUTROPHILS NFR BLD: 76.1 % (ref 44–72)
NRBC # BLD AUTO: 0 K/UL
NRBC BLD-RTO: 0 /100 WBC
PLATELET # BLD AUTO: 187 K/UL (ref 164–446)
PMV BLD AUTO: 11.6 FL (ref 9–12.9)
POTASSIUM SERPL-SCNC: 3.8 MMOL/L (ref 3.6–5.5)
PROT SERPL-MCNC: 5.6 G/DL (ref 6–8.2)
RBC # BLD AUTO: 3.47 M/UL (ref 4.2–5.4)
SIGNIFICANT IND 70042: NORMAL
SITE SITE: NORMAL
SODIUM SERPL-SCNC: 138 MMOL/L (ref 135–145)
SOURCE SOURCE: NORMAL
WBC # BLD AUTO: 20.7 K/UL (ref 4.8–10.8)

## 2023-01-29 PROCEDURE — 82550 ASSAY OF CK (CPK): CPT

## 2023-01-29 PROCEDURE — 36415 COLL VENOUS BLD VENIPUNCTURE: CPT

## 2023-01-29 PROCEDURE — 700102 HCHG RX REV CODE 250 W/ 637 OVERRIDE(OP): Performed by: PHYSICIAN ASSISTANT

## 2023-01-29 PROCEDURE — 770002 HCHG ROOM/CARE - OB PRIVATE (112)

## 2023-01-29 PROCEDURE — 700102 HCHG RX REV CODE 250 W/ 637 OVERRIDE(OP): Performed by: OBSTETRICS & GYNECOLOGY

## 2023-01-29 PROCEDURE — A9270 NON-COVERED ITEM OR SERVICE: HCPCS | Performed by: ADVANCED PRACTICE MIDWIFE

## 2023-01-29 PROCEDURE — 80053 COMPREHEN METABOLIC PANEL: CPT

## 2023-01-29 PROCEDURE — 85025 COMPLETE CBC W/AUTO DIFF WBC: CPT

## 2023-01-29 PROCEDURE — 700102 HCHG RX REV CODE 250 W/ 637 OVERRIDE(OP): Performed by: ADVANCED PRACTICE MIDWIFE

## 2023-01-29 PROCEDURE — 700111 HCHG RX REV CODE 636 W/ 250 OVERRIDE (IP): Performed by: STUDENT IN AN ORGANIZED HEALTH CARE EDUCATION/TRAINING PROGRAM

## 2023-01-29 PROCEDURE — A9270 NON-COVERED ITEM OR SERVICE: HCPCS | Performed by: OBSTETRICS & GYNECOLOGY

## 2023-01-29 PROCEDURE — A9270 NON-COVERED ITEM OR SERVICE: HCPCS | Performed by: PHYSICIAN ASSISTANT

## 2023-01-29 PROCEDURE — 99999 PR NO CHARGE: CPT | Performed by: OBSTETRICS & GYNECOLOGY

## 2023-01-29 PROCEDURE — 99231 SBSQ HOSP IP/OBS SF/LOW 25: CPT | Performed by: PSYCHIATRY & NEUROLOGY

## 2023-01-29 RX ORDER — FERROUS SULFATE 325(65) MG
325 TABLET ORAL 2 TIMES DAILY WITH MEALS
Status: DISCONTINUED | OUTPATIENT
Start: 2023-01-29 | End: 2023-01-30 | Stop reason: HOSPADM

## 2023-01-29 RX ORDER — LEVETIRACETAM 500 MG/1
1500 TABLET ORAL 2 TIMES DAILY
Status: DISCONTINUED | OUTPATIENT
Start: 2023-01-29 | End: 2023-01-30 | Stop reason: HOSPADM

## 2023-01-29 RX ADMIN — ACETAMINOPHEN 1000 MG: 500 TABLET ORAL at 06:14

## 2023-01-29 RX ADMIN — LEVETIRACETAM 1500 MG: 100 INJECTION, SOLUTION INTRAVENOUS at 04:45

## 2023-01-29 RX ADMIN — FERROUS SULFATE TAB 325 MG (65 MG ELEMENTAL FE) 325 MG: 325 (65 FE) TAB at 18:20

## 2023-01-29 RX ADMIN — LEVETIRACETAM 1500 MG: 500 TABLET, FILM COATED ORAL at 21:25

## 2023-01-29 NOTE — CARE PLAN
The patient is Watcher - Medium risk of patient condition declining or worsening    Shift Goals  Clinical Goals: Hemodynamic Stability  Patient Goals: Sleep  Family Goals: Support    Progress made toward(s) clinical / shift goals:    Problem: Knowledge Deficit - L&D  Goal: Patient and family/caregivers will demonstrate understanding of plan of care, disease process/condition, diagnostic tests and medications  Outcome: Progressing     Problem: Risk for Excess Fluid Volume  Goal: Patient will demonstrate pulse, blood pressure and neurologic signs within expected ranges and without any respiratory complications  Outcome: Progressing     Problem: Hemodynamics  Goal: Patient's hemodynamics, fluid balance and neurologic status will be stable or improve  Outcome: Met     Problem: Fluid Volume  Goal: Fluid volume balance will be maintained  Outcome: Met     Problem: Urinary - Renal Perfusion  Goal: Ability to achieve and maintain adequate renal perfusion and functioning will improve  Outcome: Met     Problem: Respiratory  Goal: Patient will achieve/maintain optimum respiratory ventilation and gas exchange  Outcome: Met     Problem: Mechanical Ventilation  Goal: Safe management of artificial airway and ventilation  Outcome: Met  Goal: Successful weaning off mechanical ventilator, spontaneously maintains adequate gas exchange  Outcome: Met  Goal: Patient will be able to express needs and understand communication  Outcome: Met     Problem: Physical Regulation  Goal: Diagnostic test results will improve  Outcome: Met  Goal: Signs and symptoms of infection will decrease  Outcome: Met     Problem: Pain - Standard  Goal: Alleviation of pain or a reduction in pain to the patient’s comfort goal  Outcome: Progressing     Problem: Knowledge Deficit - Standard  Goal: Patient and family/care givers will demonstrate understanding of plan of care, disease process/condition, diagnostic tests and medications  Outcome: Progressing      Problem: Safety - Medical Restraint  Goal: Remains free of injury from restraints (Restraint for Interference with Medical Device)  Outcome: Met  Goal: Free from restraint(s) (Restraint for Interference with Medical Device)  Outcome: Met     Problem: Risk for Aspiration  Goal: Patient's risk for aspiration will be absent or decrease  Outcome: Met     Problem: Skin Integrity  Goal: Skin integrity is maintained or improved  Outcome: Progressing     Problem: Fall Risk  Goal: Patient will remain free from falls  Outcome: Progressing

## 2023-01-29 NOTE — PROGRESS NOTES
1030 Assumed care from labor and delivery. In Tajik, oriented patient to room, call light, emergency light, TV, bed remote. Assessment completed. Plan of care reviewed, verbalized understanding. Patient denies pain at this time, will call if pain med intervention needed.

## 2023-01-29 NOTE — PROGRESS NOTES
"Post Partum Vaginal Delivery Note    Subjective: Doing well without significant complaints.  Normal lochia.  Denies HA, visual changes or RUQ pain.   No ambulation overnight.   Wants to see her baby.   Voiding.     Vitals: BP 98/51   Pulse 91   Temp 36.5 °C (97.7 °F) (Temporal)   Resp 17   Ht 1.5 m (4' 11.06\")   Wt 81.9 kg (180 lb 8.9 oz)   LMP 2022 (Exact Date)   SpO2 95%   Breastfeeding Unknown   BMI 36.40 kg/m²   Temp (24hrs), Av.4 °C (97.6 °F), Min:36.4 °C (97.5 °F), Max:36.5 °C (97.7 °F)      Exam:      GEN: Patient without distress.    Abdomen: soft, non-tender; fundus firm at/below umbilicus    Extremitie: No edema; calves non-tender    Delivery Blood Loss: 150  ml    Labs:  Recent Labs     23   WBC 28.3* 24.2* 20.7*   RBC 4.28 3.69* 3.47*   HEMOGLOBIN 12.2 10.5* 10.0*   HEMATOCRIT 38.2 32.5* 30.7*   MCV 89.3 88.1 88.5   MCH 28.5 28.5 28.8   RDW 46.0 46.8 48.8   PLATELETCT 303 195 187   MPV 11.9 11.4 11.6   NEUTSPOLYS 77.20* 86.70* 76.10*   LYMPHOCYTES 15.80* 4.90* 17.60*   MONOCYTES 5.20 6.20 5.10   EOSINOPHILS 0.00 0.00 0.00   BASOPHILS 0.20 0.40 0.20     Recent Labs     23  1430 23   SODIUM 136 143 138   POTASSIUM 3.4* 4.1 3.8   CHLORIDE 110 115* 107   CO2 14* 15* 20   GLUCOSE 109* 83 106*   BUN 14 11 11   CPKTOTAL  --  7970* 9884*     Recent Labs     23   ASTSGOT 21 54* 293*   ALTSGPT 11 20 228*   TBILIRUBIN 0.2 0.3 0.2   ALKPHOSPHAT 235* 189* 186*   GLOBULIN 3.2 2.8 2.8   INR 1.05  --   --          Impression:   17 y.o.  s/p  c/b immediate postpartum eclamptic episode. Transferred to L & D yesterday after extubation in midday.   Noted LFTs increasing today. Likely due to preeclampsia.   Bps normal  Mag off since 8 pm last evening.     Plan:   Continue postpartum care.   Continue to follow LFTs, CPK.  Mild anemia - oral iron.   Encouraged to see baby, lactation " support.   Consider transferring to postpartum unit today.       Signed By:   Caro Bird M.D.

## 2023-01-29 NOTE — PROGRESS NOTES
4 Eyes Skin Assessment Completed by KRISTINA DANGELO and KRISTINA Shah.    Head WDL  Ears WDL  Nose WDL  Mouth WDL  Neck WDL  Breast/Chest WDL  Shoulder Blades WDL  Spine WDL  (R) Arm/Elbow/Hand WDL  (L) Arm/Elbow/Hand WDL  Abdomen WDL  Groin WDL  Scrotum/Coccyx/Buttocks WDL , small incision with bandaid on lower back where epidural catheter was.   (R) Leg WDL  (L) Leg WDL  (R) Heel/Foot/Toe WDL  (L) Heel/Foot/Toe WDL          Devices In Places ECG, Tele Box, Pulse Ox, and López      Interventions In Place NC W/Ear Foams, Pillows, Q2 Turns, and Heels Loaded W/Pillows    Possible Skin Injury No    Pictures Uploaded Into Epic N/A  Wound Consult Placed N/A  RN Wound Prevention Protocol Ordered No

## 2023-01-29 NOTE — LACTATION NOTE
Breast pump and supplies brought to patients room today from her previous room on Labor and Delivery. She was in a meeting with the financial counselor so I did not talk with her.     I did provide her with a Belarusian language breast feeding support booklet. Her nurse Delia plans to determine what patients goal is for breast feeding, wether she wants to work on latching baby or continuing to pump.     Baby is going to be discharged to her father this afternoon.

## 2023-01-29 NOTE — PROGRESS NOTES
0700. Report from Kellen ACEVEDO. POC discussed and resumed.    0730. At the bedside.  [id number] used. Pt oriented x4. States pain is 0/10. Pt sitting up eating breakfast. Tolerating fluids.    0800. Labs, vitals and pt status discussed with Dr. Bird. No orders received.    0900. Orders to transfer pt to PP.    1000. Pt is shower.    1025. Pt transferred to PP in stable condition.

## 2023-01-30 ENCOUNTER — PHARMACY VISIT (OUTPATIENT)
Dept: PHARMACY | Facility: MEDICAL CENTER | Age: 18
End: 2023-01-30
Payer: COMMERCIAL

## 2023-01-30 VITALS
OXYGEN SATURATION: 98 % | TEMPERATURE: 98.5 F | BODY MASS INDEX: 36.4 KG/M2 | HEIGHT: 59 IN | WEIGHT: 180.56 LBS | RESPIRATION RATE: 18 BRPM | SYSTOLIC BLOOD PRESSURE: 106 MMHG | DIASTOLIC BLOOD PRESSURE: 70 MMHG | HEART RATE: 92 BPM

## 2023-01-30 LAB
ALBUMIN SERPL BCP-MCNC: 2.9 G/DL (ref 3.2–4.9)
ALBUMIN/GLOB SERPL: 0.9 G/DL
ALP SERPL-CCNC: 166 U/L (ref 30–99)
ALT SERPL-CCNC: 183 U/L (ref 2–50)
ANION GAP SERPL CALC-SCNC: 10 MMOL/L (ref 7–16)
AST SERPL-CCNC: 249 U/L (ref 12–45)
BASOPHILS # BLD AUTO: 0.4 % (ref 0–1.8)
BASOPHILS # BLD: 0.05 K/UL (ref 0–0.12)
BILIRUB SERPL-MCNC: 0.2 MG/DL (ref 0.1–1.5)
BUN SERPL-MCNC: 8 MG/DL (ref 8–22)
CALCIUM ALBUM COR SERPL-MCNC: 9.6 MG/DL (ref 8.5–10.5)
CALCIUM SERPL-MCNC: 8.7 MG/DL (ref 8.5–10.5)
CHLORIDE SERPL-SCNC: 107 MMOL/L (ref 96–112)
CK SERPL-CCNC: 7345 U/L (ref 0–154)
CO2 SERPL-SCNC: 21 MMOL/L (ref 20–33)
CREAT SERPL-MCNC: 0.47 MG/DL (ref 0.5–1.4)
EKG IMPRESSION: NORMAL
EOSINOPHIL # BLD AUTO: 0.08 K/UL (ref 0–0.51)
EOSINOPHIL NFR BLD: 0.6 % (ref 0–6.9)
ERYTHROCYTE [DISTWIDTH] IN BLOOD BY AUTOMATED COUNT: 47.9 FL (ref 35.9–50)
GFR SERPLBLD CREATININE-BSD FMLA CKD-EPI: 140 ML/MIN/1.73 M 2
GLOBULIN SER CALC-MCNC: 3.2 G/DL (ref 1.9–3.5)
GLUCOSE SERPL-MCNC: 88 MG/DL (ref 65–99)
HCT VFR BLD AUTO: 31.7 % (ref 37–47)
HGB BLD-MCNC: 10.3 G/DL (ref 12–16)
IMM GRANULOCYTES # BLD AUTO: 0.11 K/UL (ref 0–0.11)
IMM GRANULOCYTES NFR BLD AUTO: 0.8 % (ref 0–0.9)
LYMPHOCYTES # BLD AUTO: 3.72 K/UL (ref 1–4.8)
LYMPHOCYTES NFR BLD: 26.8 % (ref 22–41)
MCH RBC QN AUTO: 28.8 PG (ref 27–33)
MCHC RBC AUTO-ENTMCNC: 32.5 G/DL (ref 33.6–35)
MCV RBC AUTO: 88.5 FL (ref 81.4–97.8)
MONOCYTES # BLD AUTO: 0.61 K/UL (ref 0–0.85)
MONOCYTES NFR BLD AUTO: 4.4 % (ref 0–13.4)
NEUTROPHILS # BLD AUTO: 9.31 K/UL (ref 2–7.15)
NEUTROPHILS NFR BLD: 67 % (ref 44–72)
NRBC # BLD AUTO: 0 K/UL
NRBC BLD-RTO: 0 /100 WBC
PLATELET # BLD AUTO: 228 K/UL (ref 164–446)
PMV BLD AUTO: 10.5 FL (ref 9–12.9)
POTASSIUM SERPL-SCNC: 3.7 MMOL/L (ref 3.6–5.5)
PROT SERPL-MCNC: 6.1 G/DL (ref 6–8.2)
RBC # BLD AUTO: 3.58 M/UL (ref 4.2–5.4)
SODIUM SERPL-SCNC: 138 MMOL/L (ref 135–145)
WBC # BLD AUTO: 13.9 K/UL (ref 4.8–10.8)

## 2023-01-30 PROCEDURE — 700102 HCHG RX REV CODE 250 W/ 637 OVERRIDE(OP): Performed by: PHYSICIAN ASSISTANT

## 2023-01-30 PROCEDURE — 85025 COMPLETE CBC W/AUTO DIFF WBC: CPT

## 2023-01-30 PROCEDURE — 700102 HCHG RX REV CODE 250 W/ 637 OVERRIDE(OP): Performed by: OBSTETRICS & GYNECOLOGY

## 2023-01-30 PROCEDURE — RXMED WILLOW AMBULATORY MEDICATION CHARGE: Performed by: STUDENT IN AN ORGANIZED HEALTH CARE EDUCATION/TRAINING PROGRAM

## 2023-01-30 PROCEDURE — A9270 NON-COVERED ITEM OR SERVICE: HCPCS | Performed by: OBSTETRICS & GYNECOLOGY

## 2023-01-30 PROCEDURE — 80053 COMPREHEN METABOLIC PANEL: CPT

## 2023-01-30 PROCEDURE — A9270 NON-COVERED ITEM OR SERVICE: HCPCS | Performed by: PHYSICIAN ASSISTANT

## 2023-01-30 PROCEDURE — 93010 ELECTROCARDIOGRAM REPORT: CPT | Performed by: INTERNAL MEDICINE

## 2023-01-30 PROCEDURE — 36415 COLL VENOUS BLD VENIPUNCTURE: CPT

## 2023-01-30 PROCEDURE — 82550 ASSAY OF CK (CPK): CPT

## 2023-01-30 RX ORDER — IBUPROFEN 800 MG/1
800 TABLET ORAL EVERY 8 HOURS PRN
Qty: 15 TABLET | Refills: 0 | Status: SHIPPED | OUTPATIENT
Start: 2023-01-30

## 2023-01-30 RX ORDER — FERROUS SULFATE 325(65) MG
325 TABLET ORAL 2 TIMES DAILY WITH MEALS
Qty: 60 TABLET | Refills: 0 | Status: SHIPPED | OUTPATIENT
Start: 2023-01-30

## 2023-01-30 RX ORDER — LEVETIRACETAM 750 MG/1
1500 TABLET ORAL 2 TIMES DAILY
Qty: 60 TABLET | Refills: 0 | Status: SHIPPED | OUTPATIENT
Start: 2023-01-30

## 2023-01-30 RX ORDER — AMOXICILLIN 250 MG
1 CAPSULE ORAL 2 TIMES DAILY PRN
Qty: 30 TABLET | Refills: 0 | Status: SHIPPED | OUTPATIENT
Start: 2023-01-30

## 2023-01-30 RX ORDER — ACETAMINOPHEN 500 MG
1000 TABLET ORAL EVERY 6 HOURS PRN
Qty: 30 TABLET | Refills: 0 | COMMUNITY
Start: 2023-01-30

## 2023-01-30 RX ADMIN — LEVETIRACETAM 1500 MG: 500 TABLET, FILM COATED ORAL at 10:29

## 2023-01-30 RX ADMIN — FERROUS SULFATE TAB 325 MG (65 MG ELEMENTAL FE) 325 MG: 325 (65 FE) TAB at 18:41

## 2023-01-30 RX ADMIN — FERROUS SULFATE TAB 325 MG (65 MG ELEMENTAL FE) 325 MG: 325 (65 FE) TAB at 08:17

## 2023-01-30 ASSESSMENT — EDINBURGH POSTNATAL DEPRESSION SCALE (EPDS)
I HAVE BEEN ANXIOUS OR WORRIED FOR NO GOOD REASON: YES, SOMETIMES
THINGS HAVE BEEN GETTING ON TOP OF ME: NO, MOST OF THE TIME I HAVE COPED QUITE WELL
THE THOUGHT OF HARMING MYSELF HAS OCCURRED TO ME: NEVER
I HAVE FELT SCARED OR PANICKY FOR NO GOOD REASON: NO, NOT MUCH
I HAVE BEEN SO UNHAPPY THAT I HAVE HAD DIFFICULTY SLEEPING: NOT VERY OFTEN
I HAVE FELT SAD OR MISERABLE: NOT VERY OFTEN
I HAVE BEEN ABLE TO LAUGH AND SEE THE FUNNY SIDE OF THINGS: AS MUCH AS I ALWAYS COULD
I HAVE BLAMED MYSELF UNNECESSARILY WHEN THINGS WENT WRONG: YES, SOME OF THE TIME
I HAVE BEEN SO UNHAPPY THAT I HAVE BEEN CRYING: ONLY OCCASIONALLY
I HAVE LOOKED FORWARD WITH ENJOYMENT TO THINGS: RATHER LESS THAN I USED TO

## 2023-01-30 NOTE — DISCHARGE SUMMARY
Discharge Summary:     Date of Admission: 2023  Date of Discharge: 23      Admitting diagnosis:    1. Pregnancy @ 39w3d  2. Active labor      Discharge Diagnosis:   1. Status post vaginal, spontaneous.  2. Seizure, likely 2/2 eclampsia  3. Postpartum anemia  4. Transaminitis  5. Elevated CPK    No past medical history on file.  OB History    Para Term  AB Living   1 1 1     1   SAB IAB Ectopic Molar Multiple Live Births           0 1      # Outcome Date GA Lbr Devante/2nd Weight Sex Delivery Anes PTL Lv   1 Term 23 39w3d  2.964 kg (6 lb 8.6 oz) F Vag-Vacuum EPI, Local N ESTELLE     No past surgical history on file.  Patient has no known allergies.    Patient Active Problem List   Diagnosis    Encounter for supervision of normal first pregnancy in third trimester    Group beta Strep positive    Indication for care in labor or delivery    Seizure (HCC)    Cerebral edema (HCC)    Hypertension    Acute respiratory failure (HCC)    Eclampsia       Hospital Course:   Pt is a 17 y.o. now  who presented on 2023 for labor and proceeded to deliver a viable female infant via  at 39w3d. Epidural anesthesia was utilized with good effect on pain. Apgars 6, 8 at 1 and 5 minutes respectively.  ml.     The patient's immediate postpartum course was complicated by generalized tonic-clonic seizure and hyperthermia. Patient remained combative following magnesium bolus, cooling blanket, and IA acetaminophen and was treated with benzodiazepines. She was transferred to the ICU where she was intubated and sedated. Head imaging showed cerebral edema but no evidence of venous sinus thrombosis, mass, or vascular lesions. MRI Brain unremarkable. Laboratory evaluation notable for transaminitis and elevated CPK which are both downtrending on day of discharge. The patient was successfully extubated and transferred back to postpartum floor.     On day of discharge patient reports well-managed pain and  is ambulating and voiding without difficulty. She is tolerating PO intake. She reports vaginal bleeding is similar to menstrual period. She denies H/A, vision changes, CP, dyspnea, RUQ/epigastric pain, or significant peripheral edema. She is tolerating PO Keppra.     She has had no further fevers and her blood pressures have been normal for >24 hours. All maternal questions and concerns addressed    Physical Exam:  Temp:  [36.3 °C (97.4 °F)-36.9 °C (98.5 °F)] 36.4 °C (97.5 °F)  Pulse:  [] 103  Resp:  [16-18] 18  BP: (101-121)/(63-69) 121/69  SpO2:  [96 %-99 %] 98 %  Physical Exam  General: well appearing, no apparent distress  CV: RRR, nl S1 and S2  Resp: Unlabored, symmetric chest rise, CTAB  Abdomen: soft, nontender, nondistended  Fundus: firm at level below umbilicus  Perineum: Deferred  Extremities: symmetric, no peripheral edema, calves nontender  Neuro: EOMI, PERRL, CN II-XII intact, sensation intact to light touch throughout, motor function intact and symmetric throughout    Current Facility-Administered Medications   Medication Dose    ferrous sulfate tablet 325 mg  325 mg    levETIRAcetam (KEPPRA) tablet 1,500 mg  1,500 mg    lactated ringers infusion      docusate sodium (COLACE) capsule 100 mg  100 mg    ibuprofen (MOTRIN) tablet 800 mg  800 mg    acetaminophen (TYLENOL) tablet 1,000 mg  1,000 mg    magnesium hydroxide (MILK OF MAGNESIA) suspension 30 mL  30 mL    LORazepam (ATIVAN) injection 2 mg  2 mg       Recent Labs     01/28/23  0140 01/29/23  0314 01/30/23  0459   WBC 24.2* 20.7* 13.9*   RBC 3.69* 3.47* 3.58*   HEMOGLOBIN 10.5* 10.0* 10.3*   HEMATOCRIT 32.5* 30.7* 31.7*   MCV 88.1 88.5 88.5   MCH 28.5 28.8 28.8   MCHC 32.3* 32.6* 32.5*   RDW 46.8 48.8 47.9   PLATELETCT 195 187 228   MPV 11.4 11.6 10.5     Lab Results   Component Value Date/Time    SODIUM 138 01/30/2023 04:59 AM    POTASSIUM 3.7 01/30/2023 04:59 AM    CHLORIDE 107 01/30/2023 04:59 AM    CO2 21 01/30/2023 04:59 AM    GLUCOSE 88  01/30/2023 04:59 AM    BUN 8 01/30/2023 04:59 AM    CREATININE 0.47 (L) 01/30/2023 04:59 AM       Recent Labs     01/27/23  1945 01/28/23  0140 01/29/23  0314 01/30/23  0459   ASTSGOT 21 54* 293* 249*   ALTSGPT 11 20 228* 183*   TBILIRUBIN 0.2 0.3 0.2 0.2   GLOBULIN 3.2 2.8 2.8 3.2   INR 1.05  --   --   --        Latest Reference Range & Units 01/28/23 14:30 01/29/23 03:14 01/30/23 04:59   CPK Total 0 - 154 U/L 7970 () 9884 () 7345 ()   (): Data is critically high    Activity/ Discharge Instructions::   Discharge to home  Pelvic Rest x 6 weeks  No heavy lifting x4 weeks  Call or come to ED for: heavy vaginal bleeding, fever >100.4, severe abdominal pain, severe headache, vision changes, chest pain, shortness of breath,  N/V, epigastric/RUQ pain, significant peripheral edema or other concerns.       Follow up:    [facility] in 1-2 weeks  Labs for CMP and CPK about 2 days prior to visit  Outpatient referral to neurology ordered- continue Keppra PO BID until this appointment.    Discharge Meds:   Current Outpatient Medications   Medication Sig Dispense Refill    acetaminophen (TYLENOL) 500 MG Tab Take 2 Tablets by mouth every 6 hours as needed for Moderate Pain or Mild Pain. Do not exceed 4,000 mg in a 24 hour period 30 Tablet 0    ferrous sulfate 325 (65 Fe) MG tablet Take 1 Tablet by mouth 2 times a day with meals. 60 Tablet 0    ibuprofen (MOTRIN) 800 MG Tab Take 1 Tablet by mouth every 8 hours as needed for Moderate Pain. 15 Tablet 0    levETIRAcetam (KEPPRA) 750 MG tablet Take 2 Tablets by mouth 2 times a day. 60 Tablet 0    sennosides-docusate sodium (SENOKOT-S) 8.6-50 MG tablet Take 1 Tablet by mouth 2 times a day as needed (constipation). 30 Tablet 0           Starr Dietz M.D.

## 2023-01-30 NOTE — PROGRESS NOTES
Lab called with critical result of CPK at 7345. Critical lab result read back.   Jesus MILLER notified of critical lab result at 0628.  Critical lab result read back by Jesus Issa.  Also notified her of AST/ALT results.

## 2023-01-30 NOTE — DISCHARGE PLANNING
Discharge Planning Assessment Post Partum    Reason for Referral: MOB scored a 10 on the EPDS screen  Address: [address] [place] [address] [address] [place] [address]  Phone: [phone]  Type of Living Situation: living with FOB-stable living situation  Mom Diagnosis: Pregnancy, febrile seizure-was intubated in the ICU but is now back on Post Partum   Baby Diagnosis: -39.3 weeks  Primary Language: Martiniquais speaking, -Edelmira ([id number])    Father of the Baby: Travon Sage   Involved in baby’s care? Yes  Contact Information: [phone]    Prenatal Care: Yes  Mom's PCP: No PCP   PCP for new baby: Pediatrician list provided    Support System: FOB  Coping/Bonding between mother & baby: Yes  Source of Feeding: breast feeding  Supplies for Infant: prepared for infant    Mom's Insurance: Medicaid pending  Baby Covered on Insurance:Yes, once approved  Mother Employed/School: Not currently  Other children in the home/names & ages: first baby    Financial Hardship/Income: No   Mom's Mental status: alert and oriented  Services used prior to admit: None    CPS History: No  Psychiatric History: No, however MOB scored a 10 on the EPDS screen.  Discussed PP depression with family and provided resources  Domestic Violence History: No  Drug/ETOH History: No    Resources Provided: pediatrician list, children and family resource list, post partum support and counseling resources, and a list of [facility] clinics provided  Referrals Made: diaper bank referral provided     Clearance for Discharge: Infant has already discharged with FOB.  MOB is cleared for discharge per .

## 2023-01-30 NOTE — CARE PLAN
The patient is Stable - Low risk of patient condition declining or worsening    Shift Goals  Clinical Goals: VS WDL  Patient Goals: Sleep  Family Goals: Support    Progress made toward(s) clinical / shift goals:      Problem: Knowledge Deficit - L&D  Goal: Patient and family/caregivers will demonstrate understanding of plan of care, disease process/condition, diagnostic tests and medications  Outcome: Progressing     Problem: Knowledge Deficit - Standard  Goal: Patient and family/care givers will demonstrate understanding of plan of care, disease process/condition, diagnostic tests and medications  Outcome: Progressing     Problem: Skin Integrity  Goal: Skin integrity is maintained or improved  Outcome: Progressing       Patient is not progressing towards the following goals:    N/a.

## 2023-01-30 NOTE — DISCHARGE INSTRUCTIONS
Pelvic rest x6 weeks  Return for follow up visit in 1-2 weeks, need labs drawn about 2 days before f/u appointment.  Call or come to ED for: heavy vaginal bleeding, fever >100.4, severe abdominal pain, severe headache, chest pain, shortness of breath,  N/V, significant swelling of hands/feet, or other concerns.    PATIENT DISCHARGE EDUCATION INSTRUCTION SHEET    REASONS TO CALL YOUR OBSTETRICIAN  Persistent fever, shaking, chills (Temperature higher than 100.4) may indicate you have an infection  Heavy bleeding: soaking more than 1 pad per hour; Passing clots an egg-sized clot or bigger may mean you have an postpartum hemorrhage  Foul odor from vagina or bad smelling or discolored discharge or blood  Breast infection (Mastitis symptoms); breast pain, chills, fever, redness or red streaks, may feel flu like symptoms  Urinary pain, burning or frequency  Incision that is not healing, increased redness, swelling, tenderness or pain, or any pus from episiotomy or  site may mean you have an infection  Redness, swelling, warmth, or painful to touch in the calf area of your leg may mean you have a blood clot  Severe or intensified depression, thoughts or feelings of wanting to hurt yourself or someone else   Pain in chest, obstructed breathing or shortness of breath (trouble catching your breath) may mean you are having a postpartum complication. Call your provider immediately   Headache that does not get better, even after taking medicine, a bad headache with vision changes or pain in the upper right area of your belly may mean you have high blood pressure or post birth preeclampsia. Call your provider immediately    HAND WASHING  All family and friends should wash their hands:  Before and after holding the baby  Before feeding the baby  After using the restroom or changing the baby's diaper    WOUND CARE  Ask your physician for additional care instructions. In general:   Incision:  May shower and pat  incision dry   Keep the incision clean and dry  There should not be any opening or pus from the incision  Continue to walk at home 3 times a day   Do NOT lift anything heavier than your baby (over 10 pounds)  Encourage family to help participate in care of the  to allow rest and mom time to heal    VAGINAL CARE AND BLEEDING  Nothing inside vagina for 6 weeks:   No sexual intercourse, tampons or douching  Bleeding may continue for 2-4 weeks. Amount and color may vary  Soaking 1 pad or more in an hour for several hours is considered heavy bleeding  Passing large egg sized blood clots can be concerning  If you feel like you have heavy bleeding or are having increasing amount of blood clots call your Obstetrician immediately  If you begin feeling faint upon standing, feeling sick to your stomach, have clammy skin, a really fast heartbeat, have chills, start feeling confused, dizzy, sleepy or weak, or feeling like you're going to faint call your Obstetrician immediately    HYPERTENSION   Preeclampsia or gestational hypertension are types of high blood pressure that only pregnant women can get. It is important for you to be aware of symptoms to seek early intervention and treatment. If you have any of these symptoms immediately call your Obstetrician    Vision changes or blurred vision   Severe headache or pain that is unrelieved with medication and will not go away  Persistent pain in upper abdomen or shoulder   Increased swelling of face, feet, or hands  Difficulty breathing or shortness of breath at rest  Urinating less than usual    URINATION AND BOWEL MOVEMENTS  Eating more fiber (bran cereal, fruits, and vegetables) and drinking plenty of fluids will help to avoid constipation  Urinary frequency and urgency after childbirth is normal  If you experience any urinary pain, burning or frequency call your provider    BIRTH CONTROL  It is possible to become pregnant at any time after delivery and while  "breastfeeding  Plan to discuss a method of birth control with your physician at your post delivery follow up visit    POSTPARTUM BLUES  During the first few days after birth, you may experience a sense of the \"blues\" which may include impatience, irritability or even crying. These feelings come and go quickly. However, as many as 1 in 10 women experience emotional symptoms known as postpartum depression.     POSTPARTUM DEPRESSION  May start as early as the second or third day after delivery or take several weeks or months to develop. Symptoms of \"blues\" are present, but are more intense: Crying spells; loss of appetite; feelings of hopelessness or loss of control; fear of touching the baby; over concern or no concern at all about the baby; little or no concern about your own appearance/caring for yourself; and/or inability to sleep or excessive sleeping. Contact your Obstetrician if you are experiencing any of these symptoms     PREVENTING SHAKEN BABY  If you are angry or stressed, PUT THE BABY IN THE CRIB, step away, take some deep breaths, and wait until you are calm to care for the baby. DO NOT SHAKE THE BABY. You are not alone, call a supporter for help.  [facility] 24/7 crisis call line (838-734-8558) or (1-781.104.8715)  You can also text them, text \"ANSWER\" (923803)  "

## 2023-01-30 NOTE — CARE PLAN
The patient is Stable - Low risk of patient condition declining or worsening    Shift Goals  Clinical Goals: Maintain stable vitals  Patient Goals: Sleep  Family Goals: Support      Problem: Altered Physiologic Condition  Goal: Patient physiologically stable as evidenced by normal lochia, palpable uterine involution and vitals within normal limits  Outcome: Progressing  Note: Fundus firm, lochia light      Problem: Infection - Postpartum  Goal: Postpartum patient will be free of signs and symptoms of infection  Outcome: Progressing  Note: VSS. No signs or symptoms of infection noted.

## 2023-01-30 NOTE — PROGRESS NOTES
"Neurology Follow-up  Neurohospitalist Service, [facility]    Referring Physician: Will Meyer M.D.    Chief Complaint   Patient presents with    Contractions     Per patient she has been \"harpreet every 4 minutes during the night and it is getting stronger\"       ID: Pedro Zuñiga is a 17 y.o. woman presenting for whom neurology has been consulted for seizure. RN at the bedside was able to translate for history and exam.    Interval history: Doing very well.  No seizures.  Feels normal and back to her baseline.  Denies any neurologic symptoms.      Review of systems: In addition to what is detailed in the HPI above, all other systems reviewed and are negative.    Past Medical History:    has no past medical history on file.    FHx:  family history includes No Known Problems in her brother, father, maternal grandfather, maternal grandmother, mother, paternal grandfather, paternal grandmother, sister, sister, and sister.    SHx:   reports that she has never smoked. She has never used smokeless tobacco. She reports that she does not currently use alcohol. She reports that she does not use drugs.    Allergies:  No Known Allergies    Medications:    Current Facility-Administered Medications:     ferrous sulfate tablet 325 mg, 325 mg, Oral, BID WITH MEALS, Caro Bird M.D.    levETIRAcetam (Keppra) injection 1,500 mg, 1,500 mg, Intravenous, Q12HRS, Pawan Mendez M.D., 1,500 mg at 01/29/23 0445    lactated ringers infusion, , Intravenous, PRN, Carrissia Ariana, OGNP    docusate sodium (COLACE) capsule 100 mg, 100 mg, Oral, BID PRN, Carrissia Ariana, OGNP, 100 mg at 01/28/23 2320    ibuprofen (MOTRIN) tablet 800 mg, 800 mg, Oral, Q8HRS PRN, Carrissia Ariana, OGNP, 800 mg at 01/28/23 2320    acetaminophen (TYLENOL) tablet 1,000 mg, 1,000 mg, Oral, Q6HRS PRN, Carrissia Ariana, OGNP, 1,000 mg at 01/29/23 0614    [DISCONTINUED] senna-docusate (PERICOLACE or SENOKOT S) " "8.6-50 MG per tablet 2 Tablet, 2 Tablet, Enteral Tube, BID, 2 Tablet at 01/28/23 1709 **AND** [DISCONTINUED] polyethylene glycol/lytes (MIRALAX) PACKET 1 Packet, 1 Packet, Enteral Tube, QDAY PRN **AND** magnesium hydroxide (MILK OF MAGNESIA) suspension 30 mL, 30 mL, Enteral Tube, QDAY PRN **AND** [DISCONTINUED] bisacodyl (DULCOLAX) suppository 10 mg, 10 mg, Rectal, QDAY PRN, Samuel Taylor M.D.    LORazepam (ATIVAN) injection 2 mg, 2 mg, Intravenous, Q2HRS PRN, Samuel Taylor M.D.    Physical Examination:     General: Patient is awake and in no acute distress. Appears well.    NEUROLOGICAL EXAM:     BP 96/50   Pulse 81   Temp 36.2 °C (97.2 °F) (Temporal)   Resp 16   Ht 1.5 m (4' 11.06\")   Wt 81.9 kg (180 lb 8.9 oz)   LMP 04/26/2022 (Exact Date)   SpO2 97%   Breastfeeding Unknown   BMI 36.40 kg/m²       Mental status: Awake, alert and fully oriented  Speech and language: Language is fluent with intact comprehension.  No dysarthria.  Cranial nerve exam: Pupils are equal, round and reactive to light bilaterally. Visual fields are full. There is no nystagmus. Extraocular muscles are intact. Face is symmetric. Sensation in the face is intact to light touch.  Hearing is intact to conversation.  Palate elevates symmetrically. Tongue is midline.  Symmetric shoulder shrug.  Motor exam: There is sustained antigravity with no downward drift in bilateral arms and legs.  There is no pronator drift.  Bulk and tone are normal. No abnormal movements were seen on exam.  Sensory exam: Intact to light touch and temperature throughout  Deep tendon reflexes:  2+ throughout. Toes down-going bilaterally.  Coordination: No dysmetria on bilateral finger-to-nose testing  Gait: Normal-appearing native gait.  Tandem gait not assessed.  Stress gait was not assessed.    Objective Data:    Labs:  Lab Results   Component Value Date/Time    PROTHROMBTM 13.6 01/27/2023 07:45 PM    INR 1.05 01/27/2023 07:45 PM      Lab " Results   Component Value Date/Time    WBC 20.7 (H) 01/29/2023 03:14 AM    RBC 3.47 (L) 01/29/2023 03:14 AM    HEMOGLOBIN 10.0 (L) 01/29/2023 03:14 AM    HEMATOCRIT 30.7 (L) 01/29/2023 03:14 AM    MCV 88.5 01/29/2023 03:14 AM    MCH 28.8 01/29/2023 03:14 AM    MCHC 32.6 (L) 01/29/2023 03:14 AM    MPV 11.6 01/29/2023 03:14 AM    NEUTSPOLYS 76.10 (H) 01/29/2023 03:14 AM    LYMPHOCYTES 17.60 (L) 01/29/2023 03:14 AM    MONOCYTES 5.10 01/29/2023 03:14 AM    EOSINOPHILS 0.00 01/29/2023 03:14 AM    BASOPHILS 0.20 01/29/2023 03:14 AM      Lab Results   Component Value Date/Time    SODIUM 138 01/29/2023 03:14 AM    POTASSIUM 3.8 01/29/2023 03:14 AM    CHLORIDE 107 01/29/2023 03:14 AM    CO2 20 01/29/2023 03:14 AM    GLUCOSE 106 (H) 01/29/2023 03:14 AM    BUN 11 01/29/2023 03:14 AM    CREATININE 0.55 01/29/2023 03:14 AM      Lab Results   Component Value Date/Time    TRIGLYCERIDE 294 (H) 01/27/2023 07:45 PM       Lab Results   Component Value Date/Time    ALKPHOSPHAT 186 (H) 01/29/2023 03:14 AM    ASTSGOT 293 (H) 01/29/2023 03:14 AM    ALTSGPT 228 (H) 01/29/2023 03:14 AM    TBILIRUBIN 0.2 01/29/2023 03:14 AM        Imaging/Testing:    I interpreted and/or reviewed the patient's neuroimaging    MR-BRAIN-WITH & W/O   Final Result      MRI of the brain without and with contrast within normal limits.      DX-CHEST-PORTABLE (1 VIEW)   Final Result         1.  No acute cardiopulmonary disease.      CT-CTA HEAD WITH & W/O-POST PROCESS   Final Result         1.  No large vessel occlusion or aneurysm identified   2.  Diffuse sulcal effacement with slitlike ventricles, appearance favoring cerebral edema, appears similar to prior study.      CT-CTV HEAD WITH & W/O POST PROCESS   Final Result         1.  There is no evidence of dural venous sinus thrombosis.      DX-CHEST-PORTABLE (1 VIEW)   Final Result         1.  No acute cardiopulmonary disease.      CT-CTA CHEST PULMONARY ARTERY W/ RECONS   Final Result         1.  No large central  pulmonary embolus is appreciated, evaluation of the distal segmental and subsegmental branches is essentially nondiagnostic due to motion artifacts. Additional imaging would be required for definitive exclusion of small distal    pulmonary emboli.   2.  Hazy linear densities in bilateral lung bases suggests atelectasis, component of infiltrate not excluded.   3.  Hepatomegaly and diffuse hepatic steatosis      CT-HEAD W/O   Final Result         1.  Diffuse sulcal effacement and slitlike bilateral ventricles. Appearance concerning for diffuse cerebral edema, which in the peripartum period would be concerning for eclampsia.      These findings were discussed with the patient's clinician, Dr. Taylor, on 1/27/2023 9:13 PM.         DX-CHEST-PORTABLE (1 VIEW)   Final Result         1.  No acute cardiopulmonary disease.   2.  Right mainstem intubation, recommend withdrawal 3 to 4 cm.      These findings were discussed with the patient's clinician, Will Meyer, on 1/27/2023 8:42 PM.          Assessment and Plan:  Postpartum seizure- No recurrence.  Continuous EEG study without epileptiform discharges.  No seizures.  MRI brain within normal limits.  Neurologic examination is normal today.  There does not seem to be any neurologic sequelae.  No new recommendations from a neurologic perspective.  Neurology will sign off.  Please reach out for questions or neurologic decline.        James Bradley MD  Neurohospitalist, Acute Care Services

## 2023-01-30 NOTE — CARE PLAN
Problem: Knowledge Deficit - Postpartum  Goal: Patient will verbalize and demonstrate understanding of self and infant care  Outcome: Progressing     Problem: Psychosocial - Postpartum  Goal: Patient will verbalize and demonstrate effective bonding and parenting behavior  Outcome: Progressing   The patient is Stable - Low risk of patient condition declining or worsening    Shift Goals  Clinical Goals: VS WDL  Patient Goals: Sleep  Family Goals: Support    Progress made toward(s) clinical / shift goals:  pt has been ambulating in room, voiding, performing leola care. Pt states no pain. Pt has been caring for baby.     Patient is not progressing towards the following goals:

## 2023-01-30 NOTE — PROGRESS NOTES
Day shift RN received vials of Keppra for medication order to be given IV and had spoken with pharmacy regarding this medication and stated to follow up with OB MD. Report received on this pt. Call placed to PAUL Dubon to see if we are continuing with Keppra 1500 mg IV medication. I was told to follow up with [employer]. Message left for Dr. Carey at call center, awaiting his call.   Dr. Carey states he is not seeing this patient. Call placed back to Jesus and informed her of this. Advised to call Neurologist on call.   Call placed to Dr. Tyson and left a message.   Dr. Sherwood is the on call neurologist eitan. Per Dr. Sherwood patient can continue with Keppra 1500 mg may it be PO or IV.     Called Jesus Issa and received a telephone order to discontinue Keppra IV and order Keppra PO 1500 mg BID.

## 2023-01-31 NOTE — PROGRESS NOTES
Meds-to-Beds: Discharge prescription orders listed below delivered to patient's bedside. RN Lelo notified. Patient counseled.      Current Outpatient Medications   Medication Sig Dispense Refill    ferrous sulfate 325 (65 Fe) MG tablet Take 1 Tablet by mouth 2 times a day with meals. 60 Tablet 0    ibuprofen (MOTRIN) 800 MG Tab Take 1 Tablet by mouth every 8 hours as needed for Moderate Pain. 15 Tablet 0    levetiracetam (KEPPRA) 750 MG tablet Take 2 Tablets by mouth 2 times a day. 60 Tablet 0    senna-docusate (PERICOLACE OR SENOKOT S) 8.6-50 MG Tab Take 1 Tablet by mouth 2 times a day as needed (constipation). 30 Tablet 0      Paulie Rodrigues, Pharmacy Intern

## 2023-01-31 NOTE — CARE PLAN
The patient is Stable - Low risk of patient condition declining or worsening    Shift Goals  Clinical Goals: Maintain temp and VS WDL  Patient Goals: discharge home  Family Goals: Support    Progress made toward(s) clinical / shift goals:  MET

## 2023-01-31 NOTE — PROGRESS NOTES
0704- Report received from KRISTINA Heck.  Assumed care of patient.  Patient is speaking with the MD at this time.  0817- Patient assessment done.  Patient reported she is voiding without difficulty, passing flatus, and has had a bowel movement.  Patient denied dizziness and reported that she is walking without difficulty.  Patient denied pain.  Reviewed plan of care.  Patient verbalized understanding.  Via Oniel Toussaint,  #[id number], reviewed plan of care.  1028- Via Sol,  #[id number], patient informed that the medication she is receiving is Keppra.  Patient verbalized understanding.  1508- Via Mejia,  #[id number], patient encouraged to read the written patient discharge education/instruction sheet, provided in Slovak.  1810- This RN notified Jonna pharmacist, that the patient's last dose of Keppra PO was given at 1029 this morning and that the patient is being discharged this evening.  Per Jonna, Keppra may be given now as scheduled.  1850- Via Jessica,  #[id number], patient reported she read the written discharge education/instruction and has no questions.  Patient informed that she took her last dose of Keppra at 1029 this morning.  Patient informed that this RN spoke with the pharmacist, who stated she may take the dose of Keppra now before discharge.  Patient stated she will take it tonight at 2100 and will continue to take every 12 hours.  Patient informed MD order is to take the Keppra 1500 mg PO BID.  Patient verbalized understanding.  1915- Report given to KRISTINA Pérez, who assumed care of patient.

## 2023-03-03 ENCOUNTER — POST PARTUM (OUTPATIENT)
Dept: OBGYN | Facility: CLINIC | Age: 18
End: 2023-03-03

## 2023-03-03 VITALS — WEIGHT: 164.4 LBS | SYSTOLIC BLOOD PRESSURE: 104 MMHG | DIASTOLIC BLOOD PRESSURE: 58 MMHG | BODY MASS INDEX: 33.14 KG/M2

## 2023-03-03 DIAGNOSIS — Z87.59 HISTORY OF ECLAMPSIA: ICD-10-CM

## 2023-03-03 PROBLEM — O15.9 ECLAMPSIA: Status: RESOLVED | Noted: 2023-01-28 | Resolved: 2023-03-03

## 2023-03-03 PROBLEM — B95.1 GROUP BETA STREP POSITIVE: Status: RESOLVED | Noted: 2023-01-06 | Resolved: 2023-03-03

## 2023-03-03 PROBLEM — Z34.03 ENCOUNTER FOR SUPERVISION OF NORMAL FIRST PREGNANCY IN THIRD TRIMESTER: Status: RESOLVED | Noted: 2022-10-14 | Resolved: 2023-03-03

## 2023-03-03 PROCEDURE — 0503F POSTPARTUM CARE VISIT: CPT

## 2023-03-03 ASSESSMENT — EDINBURGH POSTNATAL DEPRESSION SCALE (EPDS)
I HAVE FELT SCARED OR PANICKY FOR NO GOOD REASON: NO, NOT AT ALL
I HAVE LOOKED FORWARD WITH ENJOYMENT TO THINGS: RATHER LESS THAN I USED TO
I HAVE BEEN ABLE TO LAUGH AND SEE THE FUNNY SIDE OF THINGS: AS MUCH AS I ALWAYS COULD
I HAVE BEEN SO UNHAPPY THAT I HAVE HAD DIFFICULTY SLEEPING: NOT AT ALL
I HAVE BLAMED MYSELF UNNECESSARILY WHEN THINGS WENT WRONG: NO, NEVER
I HAVE BEEN ANXIOUS OR WORRIED FOR NO GOOD REASON: NO, NOT AT ALL
I HAVE BEEN SO UNHAPPY THAT I HAVE BEEN CRYING: NO, NEVER
THINGS HAVE BEEN GETTING ON TOP OF ME: YES, MOST OF THE TIME I HAVEN'T BEEN ABLE TO COPE AT ALL
I HAVE FELT SAD OR MISERABLE: NO, NOT AT ALL
THE THOUGHT OF HARMING MYSELF HAS OCCURRED TO ME: NEVER
TOTAL SCORE: 4

## 2023-03-03 ASSESSMENT — FIBROSIS 4 INDEX: FIB4 SCORE: 1.53

## 2023-03-03 NOTE — PROGRESS NOTES
Pt here today for postpartum exam.  Delivery Date: 01/27/2023  Formula feeding only  BCM: pt would like to talk about BC options  LMP: not yet  Good ph: [phone]  Pt states no complaints or concerns today    EPDS Score: 4

## 2023-03-03 NOTE — PROGRESS NOTES
Subjective   Subjective:    Pedro Zuñiga is a 17 y.o.  female who presents for her postpartum exam. She had a  without complication. Her prenatal course was uncomplicated. However, shortly after delivery, she experienced a seizure that is suspected to be secondary to eclampsia. She had to be managed in the intensive care while intubated and sedated, but recovered well and was discharged home in stable condition on PPD# 3. Eating a regular diet without difficulty. Bowel movement are Normal.  She is experiencing pain in her lower back that is mild and intermittent, seems to be at the site of epidural placement, pain is controlled without any medications. Vaginal bleeding: has resolved without resumption of menses. She is formula feeding. She is unsure about BCM. She has never used birth control methods in the past. We reviewed at length RBA of multiple options including depo provera, IUD (mirena and copper), nexplanon implant, OCPs, and condoms. Reports no sex prior to this appointment.  Denies any S/S of PP depression. EPDS = 4    Objective   Objective:      Objective : The patient appears well, alert and oriented x 3, in no acute distress.  /58   Wt 164 lb 6.4 oz     HEENT Exam: EOMI, pupils equal and round, no adenopathy or thyromegaly.  Lungs: Clear to Auscultation   Cardiac: S1 and S2 normal, no murmurs, or rubs   Abdomen: Soft without tenderness, guarding mass or organomegaly. No diastasis  Extremities: No edema, pulses equal  Neurological: Normal, No focal signs  Breast Exam: declines  Pelvic: declines, no concerns  Pap: n/a    Assessment   Assessment:    1. PP care and examination  2. Exam WNL   3. Eclamptic seizure immediately postpartum: Did not complete CMP or creatinine kinase, reprinted  4. Desires contraception: unsure, counseling today covering LARCs and alternative methods, will call when she decided or follow up at [facility]hospitals  5. EPDS score: 4    Patient Active Problem  List    Diagnosis Date Noted    History of eclampsia 03/03/2023    Acute respiratory failure (HCC) 01/28/2023    Seizure (HCC) 01/27/2023    Cerebral edema (HCC) 01/27/2023    Hypertension 01/27/2023       Plan   Plan:    1. Postpartum support  2. Continue PNV   3. Contraceptive counseling - follow up w health dept or [facility] or [facility] for desired method, discussed alfonzo program and options with patient  4. Encouraged condom use for STI and pregnancy prevention  5. Discussed diet, exercise and resumption of sexual activity   6. Reviewed recommendations to start paps at 20yo per [facility]  7.  F/u c PCP or [facility] as needed for primary care needs.       Evangelina Davis C.N.M.

## 2024-02-25 NOTE — PROGRESS NOTES
Hospitalist Progress Note      PCP: Joaquin Kay 2025: Discharge instructions reviewed, prescribed medications given and reviewed in Bel[place]ian. Patient and partner verbalized understanding of instructions, specifically next dosage administration of scheduled discharge medications. Patient verbally expressed she had no questions or concerns. Discharge documents signed by patient. Left facility accompanied by partner and infant while escorted by staff.